# Patient Record
Sex: FEMALE | Race: WHITE | NOT HISPANIC OR LATINO | Employment: UNEMPLOYED | URBAN - METROPOLITAN AREA
[De-identification: names, ages, dates, MRNs, and addresses within clinical notes are randomized per-mention and may not be internally consistent; named-entity substitution may affect disease eponyms.]

---

## 2019-01-08 ENCOUNTER — OFFICE VISIT (OUTPATIENT)
Dept: PEDIATRICS | Facility: CLINIC | Age: 34
End: 2019-01-08
Payer: COMMERCIAL

## 2019-01-08 VITALS
DIASTOLIC BLOOD PRESSURE: 50 MMHG | BODY MASS INDEX: 27.79 KG/M2 | WEIGHT: 172.9 LBS | SYSTOLIC BLOOD PRESSURE: 100 MMHG | OXYGEN SATURATION: 97 % | TEMPERATURE: 98 F | HEART RATE: 66 BPM | HEIGHT: 66 IN

## 2019-01-08 DIAGNOSIS — L50.8 RECURRENT PERIODIC URTICARIA: Primary | ICD-10-CM

## 2019-01-08 PROCEDURE — 99203 OFFICE O/P NEW LOW 30 MIN: CPT | Performed by: NURSE PRACTITIONER

## 2019-01-08 RX ORDER — METHYLPREDNISOLONE 4 MG
TABLET, DOSE PACK ORAL
Qty: 21 TABLET | Refills: 0 | Status: SHIPPED | OUTPATIENT
Start: 2019-01-08 | End: 2020-12-09

## 2019-01-08 RX ORDER — LORATADINE 10 MG/1
10 TABLET ORAL DAILY
COMMUNITY
End: 2020-12-09

## 2019-01-08 ASSESSMENT — MIFFLIN-ST. JEOR: SCORE: 1499.99

## 2019-01-08 NOTE — PATIENT INSTRUCTIONS
PLAN:   1.   Symptomatic therapy suggested: continue claritin at least a week   2.  Orders Placed This Encounter   Medications     loratadine (CLARITIN) 10 MG tablet     Sig: Take 10 mg by mouth daily     methylPREDNISolone (MEDROL DOSEPAK) 4 MG tablet therapy pack     Sig: Follow package instructions     Dispense:  21 tablet     Refill:  0     3. Patient needs to follow up in if no improvement,or sooner if worsening of symptoms or other symptoms develop.  If recurrent please let me know and will refer to allergist         Patient Education     Hives (Adult)  Hives are pink or red bumps on the skin. These bumps are also known as wheals. The bumps can itch, burn, or sting. Hives can occur anywhere on the body. They vary in size and shape and can form in clusters. Individual hives can appear and go away quickly. New hives may develop as old ones fade. Hives are common and usually harmless. Occasionally hives are a sign of a serious allergy.  Hives are often caused by an allergic reaction. It may be an allergic reaction to foods such as fruit, shellfish, chocolate, nuts, or tomatoes. It may be a reaction to pollens, animal fur, or mold spores. Medicines, chemicals, and insect bites can also cause hives. And hives can be caused by hot sun or cold air. The cause of hives can be difficult to find.  You may be given medicines to relieve swelling and itching. Follow all instructions when using these medicines. The hives will usually fade in a few days, but can last up to 2 weeks.  Home care  Follow these tips:    Try to find the cause of the hives and eliminate it. Discuss possible causes with your healthcare provider. Future reactions to the same allergen may be worse.    Don t scratch the hives. Scratching will delay healing. To reduce itching, apply cool, wet compresses to the skin.    Dress in soft, loose cotton clothing.    Don t bathe in hot water. This can make the itching worse.    Apply an ice pack or cool pack  wrapped in a thin towel to your skin. This will help reduce redness and itching. But if your hives were caused by exposure to cold, then do not apply more cold to them.    You may use over-the counter antihistamines to reduce itching. Some older antihistamines, such as diphenhydramine and chlorpheniramine, are inexpensive. But they need to be taken often and may make you sleepy. They are best used at bedtime. Don t use diphenhydramine if you have glaucoma or have trouble urinating because of an enlarged prostate. Newer antihistamines, such as loratadine, cetirizine, and fexofenadine, are generally more expensive. But they tend to have fewer side effects, such as drowsiness. They can be taken less often.    Another type of antihistamine is used to treat heartburn. This type includes ranitidine, nizatidine, famotidine, and cimetidine. These are sometimes used along with the above antihistamines if a single medicine is not working.  Follow-up care  Follow up with your healthcare provider if your symptoms don't get better in 2 days. Ask your provider about allergy testing if you have had a severe reaction, or have had several episodes of hives. He or she can use the allergy testing to find out what you are allergic to.  When to seek medical advice  Call your healthcare provider right away if any of these occur:    Fever of 100.4 F (38.0 C) or higher, or as directed by your healthcare provider    Redness, swelling, or pain    Foul-smelling fluid coming from the rash  Call 911  Call 911 if any of the following occur:    Swelling of the face, throat, or tongue    Trouble breathing or swallowing    Dizziness, weakness, or fainting  Date Last Reviewed: 9/1/2016 2000-2018 [employer]. [address] [place] [address], [place] [address]. All rights reserved. This information is not intended as a substitute for professional medical care. Always follow your healthcare professional's instructions.

## 2019-01-08 NOTE — PROGRESS NOTES
SUBJECTIVE:   Judie Guallpa is a 33 year old female who presents to clinic today for the following health issues:    New Patient/Transfer of Care-was seen at a ob/gyn at [facility].    Rash  Onset: 1 month    Description:   Location: hives in the inner thigh, under the breast, on the side. Noticed it when she has been sweating  Character: round, raised, red  Itching (Pruritis): YES    Progression of Symptoms:  same    Accompanying Signs & Symptoms:  Fever: no   Body aches or joint pain: no   Sore throat symptoms: no   Recent cold symptoms: no     History:   Previous similar rash: no     Precipitating factors:   Exposure to similar rash: no   New exposures: None and only had worn a new dress   Recent travel: no     Alleviating factors:  none    Therapies Tried and outcome: Claritin, benadryl   Having recurrent hives in the last month or over   Will take Benaryl and they will resolve   Started taking claritin daily and that helped but did not take it this morning and ended up with hives today   Has not been on one particular spot   Has noticed with a new dress  Noticed after exercise   Laundry detergent is same  Uses her shampoo as her body wash which is not new to her as done   No fabric softener   Has completely gone away in between   Will be on different part of her body       Problem list and histories reviewed & adjusted, as indicated.  Additional history: as documented    There is no problem list on file for this patient.    History reviewed. No pertinent surgical history.    Social History     Tobacco Use     Smoking status: Never Smoker     Smokeless tobacco: Never Used   Substance Use Topics     Alcohol use: Yes     Comment: socially     Family History   Problem Relation Age of Onset     Breast Cancer Maternal Aunt      Cancer Maternal Aunt         thyroid cancer     Thyroid Disease Maternal Aunt          Current Outpatient Medications   Medication Sig Dispense Refill     loratadine (CLARITIN) 10 MG  "tablet Take 10 mg by mouth daily       No Known Allergies  BP Readings from Last 3 Encounters:   01/08/19 100/50    Wt Readings from Last 3 Encounters:   01/08/19 78.4 kg (172 lb 14.4 oz)           Labs reviewed in EPIC    Reviewed and updated as needed this visit by clinical staff  Tobacco  Allergies  Meds  Med Hx  Surg Hx  Fam Hx  Soc Hx      Reviewed and updated as needed this visit by Provider         ROS:  Constitutional, HEENT, cardiovascular, pulmonary, gi and gu systems are negative, except as otherwise noted.    OBJECTIVE:     /50 (BP Location: Right arm, Patient Position: Sitting, Cuff Size: Adult Regular)   Pulse 66   Temp 98  F (36.7  C) (Temporal)   Ht 1.683 m (5' 6.25\")   Wt 78.4 kg (172 lb 14.4 oz)   LMP 12/30/2018   SpO2 97%   Breastfeeding? No   BMI 27.70 kg/m    Body mass index is 27.7 kg/m .  GENERAL APPEARANCE: alert, active and no distress  RESP: lungs clear to auscultation - no rales, rhonchi or wheezes  CV: regular rates and rhythm and no murmur, click or rub  MS: extremities normal- no gross deformities noted  SKIN: negatives: color normal, temperature normal, mobility and turgor normal, positives:   Examination of the rash reveals: Hives:scattered  multiple pleomorphic, raised, well-defined, blanching patches with wheals and flares.  NEURO: mentation intact and speech normal  PSYCH: mentation appears normal and affect normal/bright    Diagnostic Test Results:  none     ASSESSMENT/PLAN:     Judie was seen today for establish care and hives.    Diagnoses and all orders for this visit:    Recurrent periodic urticaria  -     methylPREDNISolone (MEDROL DOSEPAK) 4 MG tablet therapy pack; Follow package instructions      PLAN:   1.   Symptomatic therapy suggested: continue claritin at least a week   2.  Orders Placed This Encounter   Medications     loratadine (CLARITIN) 10 MG tablet     Sig: Take 10 mg by mouth daily     methylPREDNISolone (MEDROL DOSEPAK) 4 MG tablet therapy " pack     Sig: Follow package instructions     Dispense:  21 tablet     Refill:  0     3. Patient needs to follow up in if no improvement,or sooner if worsening of symptoms or other symptoms develop.  If recurrent please let me know and will refer to allergist       See Patient Instructions    GLADYS Mcclure [facility]

## 2019-01-08 NOTE — NURSING NOTE
"Chief Complaint   Patient presents with     [facility] Care     Hives     hives x 1 month       Initial /50 (BP Location: Right arm, Patient Position: Sitting, Cuff Size: Adult Regular)   Pulse 66   Temp 98  F (36.7  C) (Temporal)   Ht 1.683 m (5' 6.25\")   Wt 78.4 kg (172 lb 14.4 oz)   LMP 12/30/2018   SpO2 97%   Breastfeeding? No   BMI 27.70 kg/m   Estimated body mass index is 27.7 kg/m  as calculated from the following:    Height as of this encounter: 1.683 m (5' 6.25\").    Weight as of this encounter: 78.4 kg (172 lb 14.4 oz).  Medication Reconciliation: complete      LINH Khoury      "

## 2020-03-11 ENCOUNTER — HEALTH MAINTENANCE LETTER (OUTPATIENT)
Age: 35
End: 2020-03-11

## 2020-11-10 ENCOUNTER — HOSPITAL ENCOUNTER (OUTPATIENT)
Dept: BEHAVIORAL HEALTH | Facility: CLINIC | Age: 35
Discharge: HOME | End: 2020-11-10
Attending: PSYCHIATRY & NEUROLOGY | Admitting: PSYCHIATRY & NEUROLOGY
Payer: COMMERCIAL

## 2020-11-10 PROCEDURE — 90791 PSYCH DIAGNOSTIC EVALUATION: CPT | Mod: GT | Performed by: COUNSELOR

## 2020-11-10 NOTE — PROGRESS NOTES
"Mental [facility] Assessment [facility]  Evaluator Name:  Radha White, KIAN, [facility], [facility]    PATIENT'S NAME: Judie Guallpa  PREFERRED NAME: Judie  PRONOUNS:     She/her  MRN:   [id number]  :   1985   ACCT. NUMBER: [id number]  DATE OF SERVICE: 11/10/20  START TIME: 1:01pm  END TIME: 1:41pm  PREFERRED PHONE:  [phone]  [email or web address]    May we leave a program related message: Yes  SERVICE MODALITY:  Video Visit:  Telemedicine Visit: The patient's condition can be safely assessed and treated via synchronous audio and visual telemedicine encounter.      Reason for Telemedicine Visit: Services only offered telehealth Covid    Originating Site (Patient Location): Patient's home    Distant Site (Provider Location): Provider Remote Setting    Consent:  The patient/guardian has verbally consented to: the potential risks and benefits of telemedicine (video visit) versus in person care; bill my insurance or make self-payment for services provided; and responsibility for payment of non-covered services.     Patient would like the video invitation sent by: Send to e-mail at: [email or web address]    Mode of Communication:  Video Conference via [facility]    As the provider I attest to compliance with applicable laws and regulations related to telemedicine.    UNIVERSAL ADULT DIAGNOSTIC ASSESSMENT      Identifying Information:  Patient is a 35 year old.  The pronoun use throughout this assessment reflects the patient's chosen pronoun.  Patient was referred for an assessment by self.  Patient attended the session alone.     Chief Complaint:   The reason for seeking services at this time is: \"I'm just struggling and not happy. It's been going on for a while.\" She wants to talk with someone. She estimates she felt this way before Covid, probably since she had her third child, but things have worsened with Covid     Social/Family History:  Patient reported they grew up in [place]. Her parents  when patient was " "age 8. He mother remarried and she had a stepfather. She has a biological younger brother and a younger half-sister. She has a stepsister her age and a younger stepsister.    She denied abuse but reported having \"blended family issues.\"    The patient describes their cultural background as White , middle class, [place].  Cultural influences and impact on patient's life structure, values, norms, and healthcare: None.  Contextual influences on patient's health include: None.    These factors will be addressed in the Preliminary Treatment plan.  Patient identified their preferred language to be English. Patient reported they does not need the assistance of an  or other support involved in therapy.     Patient's highest education level was graduate school in [employer]. Patient identified the following learning problems: none reported.  Modifications will not be used to assist communication in therapy. Patient reports they are  able to understand written materials.    Patient's current relationship status is  for 11 years. Patient reported having three child(natali) ages 9, 5, and 3. Patient identified no one as part of their support system. She feels like she lost all of her friends when Covid happened. She and her family moved back to [place] from [place] 2.5 years ago. She felt she was starting to make friends in her neighborhood, but can't now due to Covid. Patient reported she and her  have an appointment to virtually see a marriage therapist in [place] on Thursday. Her  set up the appointment. She stated when they talk, it always devolves into arguments. There have been angry words about divorce, but not serious. She couldn't think of hobbies she had before having children. She forces herself to run, but hates running in winter. She also stated if she ran for an hour, then she would return to a big mess in the house. Her  doesn't do much, but he tries to help. " "He works a lot of strange hours - all 3 shifts. He gets angry and mad at the kids because he wants to spend time with just her. There is tension and frustrations and she feels she is having too much time with one person. Her  gets to leave the house, but she doesn't leave or get to talk to people. She feels alone, angry. She talks with family, but they live in [place] and not [facility]. Her grandmother lives with her mother.      Patient lives her  and kids. Housing is stable. They live in a newer neighborhood and they can all see everyone's business. \"I care too much about everyone around me.\" She sees her neighbors outside socializing, sometimes distancing, and she wishes she were included, but she worries about Covid. She wants to close all the windows and not hear sounds. It is not fair that they are having fun. She has worried about what other people think.    Patient is not working and stays at home. She feels guilty when she takes the kids to activities because she feels like she is putting them at risk for Covid.   Patient reports their finances are obtained through spouse. Her  is physician and they have lived in [place] and [place] for his schooling/work.  Patient does not identify finances as a current stressor.      Patient reported that they have not been involved with the legal system.    Patient's Strengths and Limitations:   Patient identified the following strengths or resources that will help them succeed in treatment: They don't worry about finances. Things that may interfere with the patient's success in treatment include: few friends and lack of family support.     Personal and Family Medical History:   Patient does report a family history of mental health concerns. She believes her mother is anxious. Her mother's parents were recovering alcoholics.   Patient reports family history includes Breast Cancer in her maternal aunt; Cancer in her maternal aunt; Thyroid Disease in " her maternal aunt..    Her father has implied his sisters have depression. She has maternal aunts with Bipolar. Her yougner sister struggles with anxiety, but struggles with anxiety.     Patient reported the following previous diagnoses which include(s): none reported. Patient briefly worked with a counselor during graduate school while writing her thesis. She and her  started with couples counseling and she continued individually. Psychiatric Hospitalizations: None.  Patient denies a history of civil commitment.  Currently, patient is not receiving other mental health services.      Patient has not had a physical exam to rule out medical causes for current symptoms.  Date of last physical exam was greater than a year ago and client was encouraged to schedule an exam with PCP. The patient does not have a Primary Care Provider and was encouraged to establish care with a PCP.  Patient reports no current medical concerns.  There are not significant appetite / nutritional concerns / weight changes - but she stress eats. Patient does not report a history of head injury / trauma / cognitive impairment.     Current Outpatient Medications   Medication     loratadine (CLARITIN) 10 MG tablet     methylPREDNISolone (MEDROL DOSEPAK) 4 MG tablet therapy pack     Medication Adherence:  Patient reports is not taking medications.  She took Zoloft during and after third pregnancy. She thinks it helped. She denied other meds    Patient Allergies:  No Known Allergies    Medical History:  No past medical history on file.    Current Mental Status Exam:   Appearance:  Appropriate    Eye Contact:  Good   Psychomotor:  Normal       Gait / station:  no problem  Attitude / Demeanor: Cooperative  Friendly  Speech      Rate / Production: Normal/ Responsive      Volume:  Normal  volume      Language:  intact  Mood:   Sad   Affect:   Appropriate    Thought Content: Clear   Thought Process: Coherent       Associations: No loosening of  associations  Insight:   Good   Judgment:  Intact   Orientation:  All  Attention/concentration: Good    Rating Scales:    PHQ9:    PHQ-9 SCORE 11/6/2020 11/10/2020   PHQ-9 Total Score MyChart 12 (Moderate depression) 8 (Mild depression)   PHQ-9 Total Score 12 8   ;    GAD7:    VENKATESH-7 SCORE 11/6/2020 11/10/2020   Total Score 17 (severe anxiety) 17 (severe anxiety)   Total Score 17 17     Clinical Global Impressions  First:  Considering your total clinical experience with this particular patient population, how severe are the patient's symptoms at this time?: 5 (11/10/2020  1:20 PM)  Most recent:  Compared to the patient's condition at the START of treatment, this patient's condition is: 4 (11/10/2020  1:20 PM)    Substance Use:  Patient did report a family history of substance use concerns; see medical history section for details.  Patient has not received chemical dependency treatment in the past.  Patient has not ever been to detox. Patient reported the following problems as a result of their substance use: None.    Patient drinks wine a couple of times per week, consuming 1 or 2 glasses at dinner. She stated it is not a problem.   Patient denies using tobacco.  Patient denies using marijuana.  Patient drinks 2 cups of coffee in morning.   Patient reported no other substance use.     CAGE-AID Total Score 11/10/2020   Total Score 0     Based on the negative CAGE score and clinical interview there  are not indications of drug or alcohol abuse.    Significant Losses / Trauma / Abuse / Neglect Issues:   Patient did not serve in the . Concerns for possible neglect are not present. Patient did not indicate or report of significant loss, trauma, abuse or neglect issues    Safety Assessment:   Current Safety Concerns:  [place] Suicide Severity Rating Scale (Short Version)  [place] Suicide Severity Rating (Short Version) 11/10/2020   Over the past 2 weeks have you felt down, depressed, or hopeless? yes   Over the  past 2 weeks have you had thoughts of killing yourself? no   Have you ever attempted to kill yourself? no     Patient denies current homicidal ideation and behaviors.  Patient denies current self-injurious ideation and behaviors. She denied thoughts of suicide, but reported she wants to pack up and leave or be away from the house.   Patient denied risk behaviors associated with substance use.  Patient denies any high risk behaviors associated with mental health symptoms.  Patient reports the following current concerns for their personal safety: None.  Patient reports there are  firearms in the house. The firearms are secured in a locked space - in her 's safe.     History of Safety Concerns:  Patient denied a history of homicidal ideation.     Patient denied a history of personal safety concerns.    Patient denied a history of assaultive behaviors.    Patient denied a history of sexual assault behaviors.     Patient denied a history of risk behaviors associated with substance use.  Patient denies any history of high risk behaviors associated with mental health symptoms.  Patient reports the following protective factors: dedication to family/friends, safe and stable environment, abstinence from substances, living with other people, daily obligations and healthy fear of risky behaviors or pain    Risk Plan:  See Recommendations for Safety and Risk Management Plan    Review of Symptoms per patient report:  Depression: Lack of interest, Excessive or inappropriate guilt, Feelings of helplessness, Feeling sad, down, or depressed and Anger outbursts     Kristen:  No Symptoms  Psychosis: No Symptoms  Anxiety: Excessive worry, Nervousness, Ruminations, Poor concentration and Irritability - She has been snappy, loses her temper, not want to be in the same room as  and it is better when he is gone. She is mad and nervous about everything and worried about Covid and kids going to activities.   Panic:  No  symptoms  Post Traumatic Stress Disorder:  No Symptoms   Eating Disorder: No Symptoms  ADD / ADHD:  No symptoms  Conduct Disorder: No symptoms  Autism Spectrum Disorder: No symptoms  Obsessive Compulsive Disorder: No Symptoms    Patient reports the following compulsive behaviors and treatment history: None.      Diagnostic Criteria:   A. Excessive anxiety and worry about a number of events or activities (such as work or school performance).   B. The person finds it difficult to control the worry.  C. Select 3 or more symptoms (required for diagnosis). Only one item is required in children.   - Restlessness or feeling keyed up or on edge.    - Being easily fatigued.    - Difficulty concentrating or mind going blank.    - Irritability.    - Muscle tension.    - Sleep disturbance (difficulty falling or staying asleep, or restless unsatisfying sleep).   D. The focus of the anxiety and worry is not confined to features of an Axis I disorder.  E. The anxiety, worry, or physical symptoms cause clinically significant distress or impairment in social, occupational, or other important areas of functioning.   F. The disturbance is not due to the direct physiological effects of a substance (e.g., a drug of abuse, a medication) or a general medical condition (e.g., hyperthyroidism) and does not occur exclusively during a Mood Disorder, a Psychotic Disorder, or a Pervasive Developmental Disorder.  A) Recurrent episode(s) - symptoms have been present during the same 2-week period and represent a change from previous functioning 5 or more symptoms (required for diagnosis)   - Depressed mood. Note: In children and adolescents, can be irritable mood.     - Diminished interest or pleasure in all, or almost all, activities.     Functional Status:  Patient reports the following functional impairments: childcare / parenting, relationship(s) and self-care.      WHODAS 2.0 Total Score 11/10/2020   Total Score 21   Total Score MyChart 21        Clinical Summary:  1. Reason for assessment: patient is unsure what to do. She wants to talk with someone  2. Psychosocial, Cultural and Contextual Factors: None identified  3. Principal DSM5 Diagnoses  (Sustained by DSM5 Criteria Listed Above):   300.02 (F41.1) Generalized Anxiety Disorder   4. Other Diagnoses that is relevant to services:   311 (F32.9) Unspecified Depressive Disorder   5. Provisional Diagnosis:  none  6. Prognosis: Expect Improvement  7. Likely consequences of symptoms if not treated: Patient may need higher level of care  8. Client strengths include:  creative, educated, empathetic, good listener, insightful and intelligent    Recommendations:     1. Plan for Safety and Risk Management:   Recommended that patient call 911 or go to the local ED should there be a change in any of these risk factors. Report to child / adult protection services was NA.      2. Patient did not identify concerns with a cultural influence.     3. Initial Treatment will focus on: Depressed Mood, Anxiety.     4. Resources/Service Plan:    services are not indicated.   Modifications to assist communication are not indicated.   Additional disability accommodations are not indicated.      5. Collaboration:  Collaboration / coordination of treatment will be initiated with the following support professionals: None.     6.  Referrals:   Patient will establish primary care with a doctor and have a physical to rule out underlying medical concerns. Patient may discuss resuming Zoloft with the doctor. Patient and her  have couple's counseling set up this week.  recommended attending the couple's counseling and also establishing individual therapy within the same agency.      7. REYNALDO: Recommendations:  Continue to drink responsibly     8. Records were reviewed at time of assessment. Information in this assessment was obtained from the medical record and provided by patient who is a good historian.  Patient will have open access to their mental health medical record.    Eval type:  Mental Health    Provider Name/ Credentials:  KIAN Aguilar, KM, CAROLE     November 10, 2020

## 2020-12-09 ENCOUNTER — OFFICE VISIT (OUTPATIENT)
Dept: PEDIATRICS | Facility: CLINIC | Age: 35
End: 2020-12-09
Payer: COMMERCIAL

## 2020-12-09 VITALS
OXYGEN SATURATION: 99 % | BODY MASS INDEX: 27.14 KG/M2 | HEIGHT: 66 IN | HEART RATE: 61 BPM | DIASTOLIC BLOOD PRESSURE: 72 MMHG | WEIGHT: 168.9 LBS | TEMPERATURE: 97.9 F | SYSTOLIC BLOOD PRESSURE: 109 MMHG

## 2020-12-09 DIAGNOSIS — R53.83 OTHER FATIGUE: ICD-10-CM

## 2020-12-09 DIAGNOSIS — F43.23 ADJUSTMENT DISORDER WITH MIXED ANXIETY AND DEPRESSED MOOD: Primary | ICD-10-CM

## 2020-12-09 LAB
BASOPHILS # BLD AUTO: 0.1 10E9/L (ref 0–0.2)
BASOPHILS NFR BLD AUTO: 0.6 %
DIFFERENTIAL METHOD BLD: NORMAL
EOSINOPHIL # BLD AUTO: 0.1 10E9/L (ref 0–0.7)
EOSINOPHIL NFR BLD AUTO: 0.8 %
ERYTHROCYTE [DISTWIDTH] IN BLOOD BY AUTOMATED COUNT: 12.4 % (ref 10–15)
HCT VFR BLD AUTO: 41.3 % (ref 35–47)
HGB BLD-MCNC: 13.2 G/DL (ref 11.7–15.7)
IMM GRANULOCYTES # BLD: 0 10E9/L (ref 0–0.4)
IMM GRANULOCYTES NFR BLD: 0.1 %
LYMPHOCYTES # BLD AUTO: 2.4 10E9/L (ref 0.8–5.3)
LYMPHOCYTES NFR BLD AUTO: 31 %
MCH RBC QN AUTO: 30.9 PG (ref 26.5–33)
MCHC RBC AUTO-ENTMCNC: 32 G/DL (ref 31.5–36.5)
MCV RBC AUTO: 97 FL (ref 78–100)
MONOCYTES # BLD AUTO: 0.6 10E9/L (ref 0–1.3)
MONOCYTES NFR BLD AUTO: 7.9 %
NEUTROPHILS # BLD AUTO: 4.6 10E9/L (ref 1.6–8.3)
NEUTROPHILS NFR BLD AUTO: 59.6 %
PLATELET # BLD AUTO: 260 10E9/L (ref 150–450)
RBC # BLD AUTO: 4.27 10E12/L (ref 3.8–5.2)
TSH SERPL DL<=0.005 MIU/L-ACNC: 2.02 MU/L (ref 0.4–4)
WBC # BLD AUTO: 7.8 10E9/L (ref 4–11)

## 2020-12-09 PROCEDURE — 84443 ASSAY THYROID STIM HORMONE: CPT | Performed by: FAMILY MEDICINE

## 2020-12-09 PROCEDURE — 85025 COMPLETE CBC W/AUTO DIFF WBC: CPT | Performed by: FAMILY MEDICINE

## 2020-12-09 PROCEDURE — 82306 VITAMIN D 25 HYDROXY: CPT | Performed by: FAMILY MEDICINE

## 2020-12-09 PROCEDURE — 99214 OFFICE O/P EST MOD 30 MIN: CPT | Performed by: FAMILY MEDICINE

## 2020-12-09 PROCEDURE — 96127 BRIEF EMOTIONAL/BEHAV ASSMT: CPT | Performed by: FAMILY MEDICINE

## 2020-12-09 PROCEDURE — 36415 COLL VENOUS BLD VENIPUNCTURE: CPT | Performed by: FAMILY MEDICINE

## 2020-12-09 ASSESSMENT — ANXIETY QUESTIONNAIRES
GAD7 TOTAL SCORE: 13
1. FEELING NERVOUS, ANXIOUS, OR ON EDGE: MORE THAN HALF THE DAYS
5. BEING SO RESTLESS THAT IT IS HARD TO SIT STILL: NOT AT ALL
6. BECOMING EASILY ANNOYED OR IRRITABLE: NEARLY EVERY DAY
IF YOU CHECKED OFF ANY PROBLEMS ON THIS QUESTIONNAIRE, HOW DIFFICULT HAVE THESE PROBLEMS MADE IT FOR YOU TO DO YOUR WORK, TAKE CARE OF THINGS AT HOME, OR GET ALONG WITH OTHER PEOPLE: VERY DIFFICULT
7. FEELING AFRAID AS IF SOMETHING AWFUL MIGHT HAPPEN: SEVERAL DAYS
2. NOT BEING ABLE TO STOP OR CONTROL WORRYING: NEARLY EVERY DAY
3. WORRYING TOO MUCH ABOUT DIFFERENT THINGS: NEARLY EVERY DAY

## 2020-12-09 ASSESSMENT — MIFFLIN-ST. JEOR: SCORE: 1471.85

## 2020-12-09 ASSESSMENT — PATIENT HEALTH QUESTIONNAIRE - PHQ9
SUM OF ALL RESPONSES TO PHQ QUESTIONS 1-9: 11
5. POOR APPETITE OR OVEREATING: SEVERAL DAYS

## 2020-12-09 NOTE — PROGRESS NOTES
Subjective     Judie Guallpa is a 35 year old female who presents to clinic today for the following health issues:          HPI          Patient is new to the provider, is here today with concerns of having progressively worsening depression and anxiety for the past few months though symptoms have been present for a few years now  Was treated for Zoloft during pregnancy with her third child 2 years ago, that she extended the treatment for 6 months postpartum  Denies panic attacks, suicidal ideations, recreational drug use, tobacco use  Uses alcohol 1-2 servings of wine 3 times a week  Has problems with sleep secondary to mood symptoms  Patient is currently in counseling and was informed to follow-up with a provider to discuss about medications to help with symptoms on counseling patient is also wondering if she can get labs to screen for other disorders that could be aggravating her mood symptoms  Denies underlying history of anemia, abnormal bleeding, thyroid disorder  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  Feeling tired and exhausted secondary to feeling emotional distress  Other HPI as mentioned below        -Mood Problems: Depression and Anxiety Issues-Did a screening through [facility] and was informed to see her Primary Provider. Also seeing a therapist and suggested seeing her Dr as well.         How many servings of fruits and vegetables do you eat daily?  2-3    On average, how many sweetened beverages do you drink each day (Examples: soda, juice, sweet tea, etc.  Do NOT count diet or artificially sweetened beverages)?   0    How many days per week do you exercise enough to make your heart beat faster? 3 or less    How many minutes a day do you exercise enough to make your heart beat faster? 30 - 60    How many days per week do you miss taking your medication? 0        Review of Systems   CONSTITUTIONAL: NEGATIVE for  "fever, chills, change in weight  CONSTITUTIONAL: Fatigue  RESP: NEGATIVE for significant cough or SOB  CV: NEGATIVE for chest pain, palpitations or peripheral edema  NEURO: NEGATIVE for weakness, dizziness or paresthesias  ENDOCRINE: NEGATIVE for temperature intolerance, skin/hair changes  PSYCHIATRIC: as above      Objective    /72 (BP Location: Right arm, Patient Position: Sitting, Cuff Size: Adult Large)   Pulse 61   Temp 97.9  F (36.6  C) (Temporal)   Ht 1.683 m (5' 6.25\")   Wt 76.6 kg (168 lb 14.4 oz)   LMP 11/12/2020 (Exact Date)   SpO2 99%   BMI 27.06 kg/m    Body mass index is 27.06 kg/m .  Physical Exam   GENERAL: healthy, alert and no distress  RESP: lungs clear to auscultation - no rales, rhonchi or wheezes  CV: regular rate and rhythm, normal S1 S2, no S3 or S4, no murmur, click or rub, no peripheral edema and peripheral pulses strong  NEURO: Normal strength and tone, mentation intact and speech normal  PSYCH: mentation appears normal, tearful and anxious            Assessment & Plan     Adjustment disorder with mixed anxiety and depressed mood  Reviewed relaxation techniques  Recommended to start on brisk walking for at least 20 to 30 minutes daily  Recommended to start on Zoloft 50 mg daily  Continue with counseling  Follow for recheck in 5 to 6 weeks through virtual visit or sooner if needed  Dosing and potential medication side effects discussed.  Patient verbalised understanding and is agreeable to the plan.    - sertraline (ZOLOFT) 50 MG tablet; Take 1 tablet (50 mg) by mouth every evening  - EMOTIONAL / BEHAVIORAL ASSESSMENT        Other fatigue  ddx-ongoing underlying untreated depression, screen for anemia/thyroid disorder/vitamin D deficiency  Will f/u on results and call with recommendations.  Patient verbalised understanding and is agreeable to the plan.    - CBC with platelets and differential  - TSH with free T4 reflex  - Vitamin D Deficiency     BMI:   Estimated body mass " "index is 27.06 kg/m  as calculated from the following:    Height as of this encounter: 1.683 m (5' 6.25\").    Weight as of this encounter: 76.6 kg (168 lb 14.4 oz).            Chart documentation done in part with Dragon Voice recognition Software. Although reviewed after completion, some word and grammatical error may remain.    See Patient Instructions    No follow-ups on file.    Yissel Fitzpatrick MD  [facility]  Chart forwarded to PCP for FYI       "

## 2020-12-09 NOTE — PATIENT INSTRUCTIONS
Get the labs today  Get the flu shot today  Start on ZOLOFT 50 mg daily in the evening  Schedule for virtual recheck in 5-6 weeks

## 2020-12-10 LAB — DEPRECATED CALCIDIOL+CALCIFEROL SERPL-MC: 40 UG/L (ref 20–75)

## 2020-12-10 ASSESSMENT — ANXIETY QUESTIONNAIRES: GAD7 TOTAL SCORE: 13

## 2020-12-10 NOTE — RESULT ENCOUNTER NOTE
Sloan Dimas,  Your test results showed normal vitamin D, thyroid functions, hemoglobin and blood counts.  These are reassuring.   Let me know if you have any questions. Take care.  Yissel Fitzpatrick MD

## 2021-01-03 ENCOUNTER — HEALTH MAINTENANCE LETTER (OUTPATIENT)
Age: 36
End: 2021-01-03

## 2021-01-29 ENCOUNTER — VIRTUAL VISIT (OUTPATIENT)
Dept: FAMILY MEDICINE | Facility: CLINIC | Age: 36
End: 2021-01-29
Payer: COMMERCIAL

## 2021-01-29 DIAGNOSIS — F43.23 ADJUSTMENT DISORDER WITH MIXED ANXIETY AND DEPRESSED MOOD: ICD-10-CM

## 2021-01-29 PROCEDURE — 96127 BRIEF EMOTIONAL/BEHAV ASSMT: CPT | Performed by: FAMILY MEDICINE

## 2021-01-29 PROCEDURE — 99214 OFFICE O/P EST MOD 30 MIN: CPT | Mod: 95 | Performed by: FAMILY MEDICINE

## 2021-01-29 RX ORDER — BUPROPION HYDROCHLORIDE 150 MG/1
150 TABLET ORAL EVERY MORNING
Qty: 30 TABLET | Refills: 1 | Status: SHIPPED | OUTPATIENT
Start: 2021-01-29 | End: 2021-07-12

## 2021-01-29 ASSESSMENT — ANXIETY QUESTIONNAIRES
5. BEING SO RESTLESS THAT IT IS HARD TO SIT STILL: MORE THAN HALF THE DAYS
6. BECOMING EASILY ANNOYED OR IRRITABLE: MORE THAN HALF THE DAYS
GAD7 TOTAL SCORE: 12
3. WORRYING TOO MUCH ABOUT DIFFERENT THINGS: MORE THAN HALF THE DAYS
2. NOT BEING ABLE TO STOP OR CONTROL WORRYING: MORE THAN HALF THE DAYS
1. FEELING NERVOUS, ANXIOUS, OR ON EDGE: MORE THAN HALF THE DAYS
7. FEELING AFRAID AS IF SOMETHING AWFUL MIGHT HAPPEN: NOT AT ALL

## 2021-01-29 ASSESSMENT — PATIENT HEALTH QUESTIONNAIRE - PHQ9: 5. POOR APPETITE OR OVEREATING: MORE THAN HALF THE DAYS

## 2021-01-29 NOTE — PATIENT INSTRUCTIONS
Start on Wellbutrin 150 mg daily in the morning  Continue with Zoloft 50 mg the evening  Schedule for recheck through virtual visit in 2 months

## 2021-01-29 NOTE — PROGRESS NOTES
"Judie is a 36 year old who is being evaluated via a billable video visit.      How would you like to obtain your AVS? MyChart  If the video visit is dropped, the invitation should be resent by: Text to cell phone: see epic  Will anyone else be joining your video visit? No    Video Start Time: 2:05pm  Assessment & Plan     Adjustment disorder with mixed anxiety and depressed mood  Needing improvement  We will start on Wellbutrin 150 mg daily in the morning, continue Zoloft 50 mg daily in the evening.  Continue with relaxation techniques and regular exercises and healthy eating  Follow-up recheck in 2 months or sooner if needed  Dosing and potential medication side effects discussed.  Patient verbalised understanding and is agreeable to the plan.  PHQ 11/6/2020 12/9/2020   PHQ-9 Total Score 12 11   Q9: Thoughts of better off dead/self-harm past 2 weeks Not at all Not at all   Some encounter information is confidential and restricted. Go to Review Flowsheets activity to see all data.     VENKATESH-7 SCORE 11/10/2020 12/9/2020 1/29/2021   Total Score 17 (severe anxiety) - -   Total Score - 13 12   Some encounter information is confidential and restricted. Go to Review Flowsheets activity to see all data.         - sertraline (ZOLOFT) 50 MG tablet; Take 1 tablet (50 mg) by mouth every evening  - buPROPion (WELLBUTRIN XL) 150 MG 24 hr tablet; Take 1 tablet (150 mg) by mouth every morning  - EMOTIONAL / BEHAVIORAL ASSESSMENT            [id number]}     BMI:   Estimated body mass index is 27.06 kg/m  as calculated from the following:    Height as of 12/9/20: 1.683 m (5' 6.25\").    Weight as of 12/9/20: 76.6 kg (168 lb 14.4 oz).   Weight management plan: Discussed healthy diet and exercise guidelines      Regular exercise  Chart documentation done in part with Dragon Voice recognition Software. Although reviewed after completion, some word and grammatical error may remain.    See Patient Instructions    No follow-ups on " file.    Yissel Fitzpatrick MD  [facility] VIGNESH Dimas is a 36 year old who presents to clinic today for the following health issues     HPI   Patient is here for a video visit instead of in person visit due to the current COVID-19 pandemic.  Patient is here for follow-up on her depression and anxiety after being started on Zoloft 50 mg daily few weeks ago.  Patient noted very little improvement in symptoms and she is wondering if she can switch to Wellbutrin 150 mg as suggested by her physician .  Patient denies panic attacks,  Had problems with sleep the past few weeks after starting on Zoloft when she wakes up around 2- 3 AM and was finding it hard to go back to bed.  Denies waking up due to anxiety        Depression and Anxiety Follow-Up    How are you doing with your depression since your last visit?  Very slightly improved    How are you doing with your anxiety since your last visit?  as above      Are you having other symptoms that might be associated with depression or anxiety? No    Have you had a significant life event? No     Do you have any concerns with your use of alcohol or other drugs? No    Social History     Tobacco Use     Smoking status: Never Smoker     Smokeless tobacco: Never Used   Substance Use Topics     Alcohol use: Yes     Comment: socially     Drug use: No     PHQ 11/6/2020 12/9/2020   PHQ-9 Total Score 12 11   Q9: Thoughts of better off dead/self-harm past 2 weeks Not at all Not at all   Some encounter information is confidential and restricted. Go to Review Flowsheets activity to see all data.     VENKATESH-7 SCORE 11/10/2020 12/9/2020 1/29/2021   Total Score 17 (severe anxiety) - -   Total Score - 13 12   Some encounter information is confidential and restricted. Go to Review Flowsheets activity to see all data.     Last PHQ-9 1/29/2021   1.  Little interest or pleasure in doing things 1   2.  Feeling down, depressed, or hopeless 0   3.  Trouble  falling or staying asleep, or sleeping too much 3   4.  Feeling tired or having little energy 2   5.  Poor appetite or overeating 0   6.  Feeling bad about yourself 1   7.  Trouble concentrating 0   8.  Moving slowly or restless 2   Q9: Thoughts of better off dead/self-harm past 2 weeks -   PHQ-9 Total Score -   Difficulty at work, home, or with people -   Some encounter information is confidential and restricted. Go to Review [employer] activity to see all data.     VENKATESH-7  1/29/2021   1. Feeling nervous, anxious, or on edge 2   2. Not being able to stop or control worrying 2   3. Worrying too much about different things 2   4. Trouble relaxing 2   5. Being so restless that it is hard to sit still 2   6. Becoming easily annoyed or irritable 2   7. Feeling afraid, as if something awful might happen 0   VENKATESH-7 Total Score 12   If you checked any problems, how difficult have they made it for you to do your work, take care of things at home, or get along with other people? -   Some encounter information is confidential and restricted. Go to Review [employer] activity to see all data.       Suicide Assessment Five-step Evaluation and Treatment (SAFE-T)            Review of Systems   CONSTITUTIONAL: NEGATIVE for fever, chills, change in weight  PSYCHIATRIC: as above      Objective           Vitals:  No vitals were obtained today due to virtual visit.    Physical Exam   GENERAL: Healthy, alert and no distress  RESP: No audible wheeze, cough, or visible cyanosis.  No visible retractions or increased work of breathing.    PSYCH: Mentation appears normal, affect normal/bright, judgement and insight intact, normal speech and appearance well-groomed.            Video-Visit Details    Type of service:  Video Visit    Video End Time:2:18 PM    Originating Location (pt. Location): Home    Distant Location (provider location):  [facility]     Platform used for Video Visit: [employer]

## 2021-01-30 ASSESSMENT — ANXIETY QUESTIONNAIRES: GAD7 TOTAL SCORE: 12

## 2021-04-25 ENCOUNTER — HEALTH MAINTENANCE LETTER (OUTPATIENT)
Age: 36
End: 2021-04-25

## 2021-07-12 ENCOUNTER — VIRTUAL VISIT (OUTPATIENT)
Dept: FAMILY MEDICINE | Facility: CLINIC | Age: 36
End: 2021-07-12
Payer: COMMERCIAL

## 2021-07-12 DIAGNOSIS — F43.23 ADJUSTMENT DISORDER WITH MIXED ANXIETY AND DEPRESSED MOOD: ICD-10-CM

## 2021-07-12 PROCEDURE — 96127 BRIEF EMOTIONAL/BEHAV ASSMT: CPT | Mod: 95 | Performed by: FAMILY MEDICINE

## 2021-07-12 PROCEDURE — 99213 OFFICE O/P EST LOW 20 MIN: CPT | Mod: 95 | Performed by: FAMILY MEDICINE

## 2021-07-12 ASSESSMENT — ANXIETY QUESTIONNAIRES
3. WORRYING TOO MUCH ABOUT DIFFERENT THINGS: SEVERAL DAYS
5. BEING SO RESTLESS THAT IT IS HARD TO SIT STILL: SEVERAL DAYS
GAD7 TOTAL SCORE: 6
1. FEELING NERVOUS, ANXIOUS, OR ON EDGE: SEVERAL DAYS
7. FEELING AFRAID AS IF SOMETHING AWFUL MIGHT HAPPEN: NOT AT ALL
6. BECOMING EASILY ANNOYED OR IRRITABLE: SEVERAL DAYS
2. NOT BEING ABLE TO STOP OR CONTROL WORRYING: SEVERAL DAYS
IF YOU CHECKED OFF ANY PROBLEMS ON THIS QUESTIONNAIRE, HOW DIFFICULT HAVE THESE PROBLEMS MADE IT FOR YOU TO DO YOUR WORK, TAKE CARE OF THINGS AT HOME, OR GET ALONG WITH OTHER PEOPLE: NOT DIFFICULT AT ALL

## 2021-07-12 ASSESSMENT — PATIENT HEALTH QUESTIONNAIRE - PHQ9
SUM OF ALL RESPONSES TO PHQ QUESTIONS 1-9: 3
5. POOR APPETITE OR OVEREATING: SEVERAL DAYS

## 2021-07-12 NOTE — PROGRESS NOTES
Judie is a 36 year old who is being evaluated via a billable video visit.      How would you like to obtain your AVS? [employer]oonRockethart  If the video visit is dropped, the invitation should be resent by: Text to cell phone: see epic  Will anyone else be joining your video visit? No    Video Start Time: 9:02 AM    Assessment & Plan     Adjustment disorder with mixed anxiety and depressed mood    PHQ 11/6/2020 12/9/2020 7/12/2021   PHQ-9 Total Score 12 11 3   Q9: Thoughts of better off dead/self-harm past 2 weeks Not at all Not at all Not at all   Some encounter information is confidential and restricted. Go to Review Flowsheets activity to see all data.     VENKATESH-7 SCORE 12/9/2020 1/29/2021 7/12/2021   Total Score - - -   Total Score 13 12 6   Some encounter information is confidential and restricted. Go to Review Flowsheets activity to see all data.     Needing improvement  Will increase the dose of sertraline from 50 mg to 75 mg daily, will send a ftopia message in 4 to 6 weeks if no improvement noted by the nurse sooner if needed  Follow-up in 6 months at the time of physical or sooner if needed  Also recommended to start taking over-the-counter vitamin D 1000 units daily  - sertraline (ZOLOFT) 50 MG tablet; Take 1.5 tablets (75 mg) by mouth every evening Dose change  - EMOTIONAL / BEHAVIORAL ASSESSMENT  :374506}     Chart documentation done in part with Dragon Voice recognition Software. Although reviewed after completion, some word and grammatical error may remain.    See Patient Instructions    Return in about 6 months (around 1/12/2022), or if symptoms worsen or fail to improve, for Physical Exam, fasting labs.    Yissel Fitzpatrick MD  [facility]   Judie is a 36 year old who presents for the following health issues   Patient is here for a video visit instead of in person visit due to the current COVID-19 pandemic.      History of Present Illness       She eats 2-3 servings of fruits  and vegetables daily.She consumes 0 sweetened beverage(s) daily.        Depression and Anxiety Follow-Up    How are you doing with your depression since your last visit? No change    How are you doing with your anxiety since your last visit?  Worsened , needing to adjust the dose of medication    Patient was not able to remember to take Wellbutrin in the morning, she has not continued taking it after 2 days of starting    Are you having other symptoms that might be associated with depression or anxiety? No    Have you had a significant life event? No     Do you have any concerns with your use of alcohol or other drugs? No    Social History     Tobacco Use     Smoking status: Never Smoker     Smokeless tobacco: Never Used   Substance Use Topics     Alcohol use: Yes     Comment: socially     Drug use: No     PHQ 11/6/2020 12/9/2020 7/12/2021   PHQ-9 Total Score 12 11 3   Q9: Thoughts of better off dead/self-harm past 2 weeks Not at all Not at all Not at all   Some encounter information is confidential and restricted. Go to Review [employer] activity to see all data.     VENKATESH-7 SCORE 12/9/2020 1/29/2021 7/12/2021   Total Score - - -   Total Score 13 12 6   Some encounter information is confidential and restricted. Go to Review [employer] activity to see all data.     Last PHQ-9 7/12/2021   1.  Little interest or pleasure in doing things 0   2.  Feeling down, depressed, or hopeless 0   3.  Trouble falling or staying asleep, or sleeping too much 1   4.  Feeling tired or having little energy 1   5.  Poor appetite or overeating 0   6.  Feeling bad about yourself 1   7.  Trouble concentrating 0   8.  Moving slowly or restless 0   Q9: Thoughts of better off dead/self-harm past 2 weeks 0   PHQ-9 Total Score 3   Difficulty at work, home, or with people Not difficult at all   Some encounter information is confidential and restricted. Go to Review [employer] activity to see all data.     VENKATESH-7  7/12/2021   1. Feeling nervous,  anxious, or on edge 1   2. Not being able to stop or control worrying 1   3. Worrying too much about different things 1   4. Trouble relaxing 1   5. Being so restless that it is hard to sit still 1   6. Becoming easily annoyed or irritable 1   7. Feeling afraid, as if something awful might happen 0   VENKATESH-7 Total Score 6   If you checked any problems, how difficult have they made it for you to do your work, take care of things at home, or get along with other people? Not difficult at all   Some encounter information is confidential and restricted. Go to Review Flowsheets activity to see all data.               Review of Systems   CONSTITUTIONAL: NEGATIVE for fever, chills, change in weight  PSYCHIATRIC: as above      Objective           Vitals:  No vitals were obtained today due to virtual visit.    Physical Exam   GENERAL: Healthy, alert and no distress  RESP: No audible wheeze, cough, or visible cyanosis.  No visible retractions or increased work of breathing.    PSYCH: Mentation appears normal, affect normal/bright, judgement and insight intact, normal speech and appearance well-groomed.                Video-Visit Details    Type of service:  Video Visit    Video End Time:9:10 AM    Originating Location (pt. Location): Home    Distant Location (provider location):  [facility]     Platform used for Video Visit: [employer]

## 2021-07-12 NOTE — PATIENT INSTRUCTIONS
Increase the dose of Zoloft from 50 mg to 1.5 tablet - 75 mg daily  MyChart or call to schedule for annual physical and recheck in 6 months or sooner if needed

## 2021-07-13 ASSESSMENT — ANXIETY QUESTIONNAIRES: GAD7 TOTAL SCORE: 6

## 2021-10-10 ENCOUNTER — HEALTH MAINTENANCE LETTER (OUTPATIENT)
Age: 36
End: 2021-10-10

## 2022-01-07 DIAGNOSIS — Z13.1 SCREENING FOR DIABETES MELLITUS (DM): ICD-10-CM

## 2022-01-07 DIAGNOSIS — Z13.0 SCREENING FOR DEFICIENCY ANEMIA: ICD-10-CM

## 2022-01-07 DIAGNOSIS — Z11.59 NEED FOR HEPATITIS C SCREENING TEST: Primary | ICD-10-CM

## 2022-01-07 DIAGNOSIS — Z13.220 SCREENING FOR HYPERLIPIDEMIA: ICD-10-CM

## 2022-01-26 ENCOUNTER — OFFICE VISIT (OUTPATIENT)
Dept: FAMILY MEDICINE | Facility: CLINIC | Age: 37
End: 2022-01-26
Payer: COMMERCIAL

## 2022-01-26 VITALS
TEMPERATURE: 98 F | SYSTOLIC BLOOD PRESSURE: 114 MMHG | DIASTOLIC BLOOD PRESSURE: 70 MMHG | HEIGHT: 67 IN | OXYGEN SATURATION: 98 % | WEIGHT: 186 LBS | HEART RATE: 55 BPM | BODY MASS INDEX: 29.19 KG/M2

## 2022-01-26 DIAGNOSIS — F43.23 ADJUSTMENT DISORDER WITH MIXED ANXIETY AND DEPRESSED MOOD: ICD-10-CM

## 2022-01-26 DIAGNOSIS — Z00.00 ROUTINE GENERAL MEDICAL EXAMINATION AT A HEALTH CARE FACILITY: Primary | ICD-10-CM

## 2022-01-26 DIAGNOSIS — Z13.0 SCREENING FOR DEFICIENCY ANEMIA: ICD-10-CM

## 2022-01-26 DIAGNOSIS — Z12.4 CERVICAL CANCER SCREENING: ICD-10-CM

## 2022-01-26 DIAGNOSIS — Z13.220 SCREENING FOR HYPERLIPIDEMIA: ICD-10-CM

## 2022-01-26 DIAGNOSIS — Z11.59 NEED FOR HEPATITIS C SCREENING TEST: ICD-10-CM

## 2022-01-26 DIAGNOSIS — R73.01 ELEVATED FASTING GLUCOSE: ICD-10-CM

## 2022-01-26 DIAGNOSIS — M54.16 ACUTE RIGHT LUMBAR RADICULOPATHY: ICD-10-CM

## 2022-01-26 DIAGNOSIS — Z13.1 SCREENING FOR DIABETES MELLITUS (DM): ICD-10-CM

## 2022-01-26 LAB
ALBUMIN SERPL-MCNC: 4.2 G/DL (ref 3.4–5)
ALP SERPL-CCNC: 39 U/L (ref 40–150)
ALT SERPL W P-5'-P-CCNC: 29 U/L (ref 0–50)
ANION GAP SERPL CALCULATED.3IONS-SCNC: 3 MMOL/L (ref 3–14)
AST SERPL W P-5'-P-CCNC: 17 U/L (ref 0–45)
BILIRUB SERPL-MCNC: 0.4 MG/DL (ref 0.2–1.3)
BUN SERPL-MCNC: 14 MG/DL (ref 7–30)
CALCIUM SERPL-MCNC: 9.4 MG/DL (ref 8.5–10.1)
CHLORIDE BLD-SCNC: 104 MMOL/L (ref 94–109)
CHOLEST SERPL-MCNC: 243 MG/DL
CO2 SERPL-SCNC: 31 MMOL/L (ref 20–32)
CREAT SERPL-MCNC: 0.74 MG/DL (ref 0.52–1.04)
ERYTHROCYTE [DISTWIDTH] IN BLOOD BY AUTOMATED COUNT: 13.4 % (ref 10–15)
FASTING STATUS PATIENT QL REPORTED: YES
GFR SERPL CREATININE-BSD FRML MDRD: >90 ML/MIN/1.73M2
GLUCOSE BLD-MCNC: 102 MG/DL (ref 70–99)
HBA1C MFR BLD: 4.9 % (ref 0–5.6)
HCT VFR BLD AUTO: 38.2 % (ref 35–47)
HCV AB SERPL QL IA: NONREACTIVE
HDLC SERPL-MCNC: 66 MG/DL
HGB BLD-MCNC: 11.9 G/DL (ref 11.7–15.7)
LDLC SERPL CALC-MCNC: 158 MG/DL
MCH RBC QN AUTO: 31.2 PG (ref 26.5–33)
MCHC RBC AUTO-ENTMCNC: 31.2 G/DL (ref 31.5–36.5)
MCV RBC AUTO: 100 FL (ref 78–100)
NONHDLC SERPL-MCNC: 177 MG/DL
PLATELET # BLD AUTO: 261 10E3/UL (ref 150–450)
POTASSIUM BLD-SCNC: 4.3 MMOL/L (ref 3.4–5.3)
PROT SERPL-MCNC: 7.8 G/DL (ref 6.8–8.8)
RBC # BLD AUTO: 3.82 10E6/UL (ref 3.8–5.2)
SODIUM SERPL-SCNC: 138 MMOL/L (ref 133–144)
TRIGL SERPL-MCNC: 95 MG/DL
WBC # BLD AUTO: 5.4 10E3/UL (ref 4–11)

## 2022-01-26 PROCEDURE — 80053 COMPREHEN METABOLIC PANEL: CPT | Performed by: FAMILY MEDICINE

## 2022-01-26 PROCEDURE — 85027 COMPLETE CBC AUTOMATED: CPT | Performed by: FAMILY MEDICINE

## 2022-01-26 PROCEDURE — 99214 OFFICE O/P EST MOD 30 MIN: CPT | Mod: 25 | Performed by: FAMILY MEDICINE

## 2022-01-26 PROCEDURE — G0145 SCR C/V CYTO,THINLAYER,RESCR: HCPCS | Performed by: FAMILY MEDICINE

## 2022-01-26 PROCEDURE — 36415 COLL VENOUS BLD VENIPUNCTURE: CPT | Performed by: FAMILY MEDICINE

## 2022-01-26 PROCEDURE — 83036 HEMOGLOBIN GLYCOSYLATED A1C: CPT | Performed by: FAMILY MEDICINE

## 2022-01-26 PROCEDURE — 87624 HPV HI-RISK TYP POOLED RSLT: CPT | Performed by: FAMILY MEDICINE

## 2022-01-26 PROCEDURE — 80061 LIPID PANEL: CPT | Performed by: FAMILY MEDICINE

## 2022-01-26 PROCEDURE — 86803 HEPATITIS C AB TEST: CPT | Performed by: FAMILY MEDICINE

## 2022-01-26 PROCEDURE — 96127 BRIEF EMOTIONAL/BEHAV ASSMT: CPT | Performed by: FAMILY MEDICINE

## 2022-01-26 PROCEDURE — 99395 PREV VISIT EST AGE 18-39: CPT | Performed by: FAMILY MEDICINE

## 2022-01-26 RX ORDER — SERTRALINE HYDROCHLORIDE 100 MG/1
100 TABLET, FILM COATED ORAL DAILY
Qty: 90 TABLET | Refills: 1 | Status: SHIPPED | OUTPATIENT
Start: 2022-01-26 | End: 2022-08-22 | Stop reason: SINTOL

## 2022-01-26 RX ORDER — PREDNISONE 20 MG/1
TABLET ORAL
Qty: 20 TABLET | Refills: 0 | Status: SHIPPED | OUTPATIENT
Start: 2022-01-26 | End: 2022-02-14

## 2022-01-26 RX ORDER — GABAPENTIN 100 MG/1
100 CAPSULE ORAL AT BEDTIME
Qty: 30 CAPSULE | Refills: 0 | Status: CANCELLED | OUTPATIENT
Start: 2022-01-26

## 2022-01-26 ASSESSMENT — ENCOUNTER SYMPTOMS
HEMATOCHEZIA: 0
NAUSEA: 0
CONSTIPATION: 0
SORE THROAT: 0
WEAKNESS: 0
DYSURIA: 0
ABDOMINAL PAIN: 0
HEADACHES: 0
FREQUENCY: 0
SHORTNESS OF BREATH: 0
CHILLS: 0
HEMATURIA: 0
DIARRHEA: 0
JOINT SWELLING: 0
BREAST MASS: 0
ARTHRALGIAS: 0
MYALGIAS: 1
NERVOUS/ANXIOUS: 0
COUGH: 0
FEVER: 0
HEARTBURN: 0
EYE PAIN: 0
PALPITATIONS: 0
DIZZINESS: 0
PARESTHESIAS: 0

## 2022-01-26 ASSESSMENT — ANXIETY QUESTIONNAIRES
GAD7 TOTAL SCORE: 4
2. NOT BEING ABLE TO STOP OR CONTROL WORRYING: NOT AT ALL
1. FEELING NERVOUS, ANXIOUS, OR ON EDGE: SEVERAL DAYS
IF YOU CHECKED OFF ANY PROBLEMS ON THIS QUESTIONNAIRE, HOW DIFFICULT HAVE THESE PROBLEMS MADE IT FOR YOU TO DO YOUR WORK, TAKE CARE OF THINGS AT HOME, OR GET ALONG WITH OTHER PEOPLE: NOT DIFFICULT AT ALL
7. FEELING AFRAID AS IF SOMETHING AWFUL MIGHT HAPPEN: NOT AT ALL
5. BEING SO RESTLESS THAT IT IS HARD TO SIT STILL: NOT AT ALL
3. WORRYING TOO MUCH ABOUT DIFFERENT THINGS: SEVERAL DAYS
6. BECOMING EASILY ANNOYED OR IRRITABLE: SEVERAL DAYS

## 2022-01-26 ASSESSMENT — MIFFLIN-ST. JEOR: SCORE: 1543.38

## 2022-01-26 ASSESSMENT — PATIENT HEALTH QUESTIONNAIRE - PHQ9
SUM OF ALL RESPONSES TO PHQ QUESTIONS 1-9: 3
5. POOR APPETITE OR OVEREATING: SEVERAL DAYS

## 2022-01-26 ASSESSMENT — PAIN SCALES - GENERAL: PAINLEVEL: MODERATE PAIN (4)

## 2022-01-26 NOTE — PROGRESS NOTES
SUBJECTIVE:   CC: Judei Guallpa is an 37 year old woman who presents for preventive health visit.     {Split Bill scripting  The purpose of this visit is to discuss your medical history and prevent health problems before you are sick. You may be responsible for a co-pay, coinsurance, or deductible if your visit today includes services such as checking on a sore throat, having an x-ray or lab test, or treating and evaluating a new or existing condition :717603}  {Patient advised of split billing (Optional):687899}  HPI  {Add if <65 person on Medicare  - Required Questions (Optional):787032}  {Outside tests to abstract? :709119}    {additional problems to add (Optional):356677}    Today's PHQ-2 Score:   PHQ-2 ( 1999 Pfizer) 1/26/2022   Q1: Little interest or pleasure in doing things 1   Q2: Feeling down, depressed or hopeless 0   PHQ-2 Score 1   PHQ-2 Total Score (12-17 Years)- Positive if 3 or more points; Administer PHQ-A if positive -   Q1: Little interest or pleasure in doing things Several days   Q2: Feeling down, depressed or hopeless Not at all   PHQ-2 Score 1       Abuse: Current or Past (Physical, Sexual or Emotional) - { :755351}  Do you feel safe in your environment? { :465852}        Social History     Tobacco Use     Smoking status: Never Smoker     Smokeless tobacco: Never Used   Substance Use Topics     Alcohol use: Yes     Comment: socially     {Rooming Staff- Complete this question if Prescreen response is not shown below for today's visit. If you drink alcohol do you typically have >3 drinks per day or >7 drinks per week? (Optional):831970}    Alcohol Use 1/26/2022   Prescreen: >3 drinks/day or >7 drinks/week? Yes   Prescreen: >3 drinks/day or >7 drinks/week? -   AUDIT SCORE  9   {add AUDIT responses (Optional) (A score of 7 for adult men is an indication of hazardous drinking; a score of 8 or more is an indication of an alcohol use disorder.  A score of 7 or more for adult women is an  "indication of hazardous drinking or an alchohol use disorder):596744}    Reviewed orders with patient.  Reviewed health maintenance and updated orders accordingly - { :291958::\"Yes\"}  {Chronicprobdata (optional):013457}    Breast Cancer Screening:    FHS-7:   Breast CA Risk Assessment (FHS-7) 1/26/2022   Did any of your first-degree relatives have breast or ovarian cancer? No   Did any of your relatives have bilateral breast cancer? Yes   Did any man in your family have breast cancer? No   Did any woman in your family have breast and ovarian cancer? Yes   Did any woman in your family have breast cancer before age 50 y? No   Do you have 2 or more relatives with breast and/or ovarian cancer? No   Do you have 2 or more relatives with breast and/or bowel cancer? No     {If any of the questions to the BCRA (FHS-7) are answered yes, consider ordering referral for genetic counseling (Optional) :662853::\"click delete button to remove this line now\"}  {AMB Mammogram Decision Support (Optional) :970506}  Pertinent mammograms are reviewed under the imaging tab.    History of abnormal Pap smear: { :633456}     Reviewed and updated as needed this visit by clinical staff  Tobacco  Allergies  Meds   Med Hx  Surg Hx  Fam Hx  Soc Hx       Reviewed and updated as needed this visit by Provider               {HISTORY OPTIONS (Optional):028869}    Review of Systems  {FEMALE ROS (Optional):086302}     OBJECTIVE:   /70 (BP Location: Right arm, Patient Position: Sitting, Cuff Size: Adult Regular)   Pulse 55   Temp 98  F (36.7  C) (Oral)   Ht 1.689 m (5' 6.5\")   Wt 84.4 kg (186 lb)   LMP 12/24/2021   SpO2 98%   BMI 29.57 kg/m    Physical Exam  {Exam Choices (Optional):172368}    {Diagnostic Test Results (Optional):572836::\"Diagnostic Test Results:\",\"Labs reviewed in Epic\"}    ASSESSMENT/PLAN:   {Diag Picklist:921448}    {Patient advised of split billing (Optional):653826}    COUNSELING:  {FEMALE COUNSELING " "MESSAGES:258841::\"Reviewed preventive health counseling, as reflected in patient instructions\"}    Estimated body mass index is 29.57 kg/m  as calculated from the following:    Height as of this encounter: 1.689 m (5' 6.5\").    Weight as of this encounter: 84.4 kg (186 lb).    {Weight Management Plan (ACO) Complete if BMI is abnormal-  Ages 18-64  BMI >24.9.  Age 65+ with BMI <23 or >30 (Optional):211133}    She reports that she has never smoked. She has never used smokeless tobacco.      Counseling Resources:  ATP IV Guidelines  Pooled Cohorts Equation Calculator  Breast Cancer Risk Calculator  BRCA-Related Cancer Risk Assessment: FHS-7 Tool  FRAX Risk Assessment  ICSI Preventive Guidelines  Dietary Guidelines for Americans, 2010  USDA's MyPlate  ASA Prophylaxis  Lung CA Screening    Yissel Fitzpatrick MD  [facility]  "

## 2022-01-26 NOTE — RESULT ENCOUNTER NOTE
Dear Judie,  Your lab test showed normal hemoglobin with no concern for anemia, normal liver and kidney functions and negative hepatitis C screening test.  These are good and reassuring  Your fasting blood sugar is slightly elevated but your diabetes screening test A1c is within normal range, this is good.  Your fasting cholesterol numbers are moderately elevated.   Desired or goal levels are:  TOTAL CHOLESTEROL: Desirable is less than 200.   HDL (Good Cholesterol): Desirable is greater than 40 (for men) greater than 50 (for women).  LDL (Bad Cholesterol): Desirable is less than 130 (or less than 100 if you have heart disease or diabetes). Borderline 130-160.  TRIGLYCERIDES: Desirable is less than 150.  Borderline is 150-200..    As you may know, an elevated cholesterol is one factor that increases your risk for heart disease and stroke. You can improve your cholesterol by controlling the amount and type of fat you eat and by increasing your daily activity level.  Here are some ways to improve your nutrition:  Eat less fat (especially butter, Crisco and other saturated fats)  Buy lean cuts of meat, reduce your portions of red meat or substitute poultry or fish  Use skim milk and low-fat dairy products  Eat no more than 4 egg yolks per week  Avoid fried or fast foods that are high in fat  Eat more fruits and vegetables  As we discussed today, please avoid or limit alcohol but not more than 1 drink per day  Also consider starting or increasing your aerobic activity. Aerobic activity is the best way to improve HDL (good) cholesterol.    Let me know if you have any questions. Take care.  Yissel Fitzpatrick MD
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
## 2022-01-26 NOTE — PATIENT INSTRUCTIONS
"  Preventive Health Recommendations  Female Ages 26 - 39  Yearly exam:   See your health care provider every year in order to    Review health changes.     Discuss preventive care.      Review your medicines if you your doctor has prescribed any.    Until age 30: Get a Pap test every three years (more often if you have had an abnormal result).    After age 30: Talk to your doctor about whether you should have a Pap test every 3 years or have a Pap test with HPV screening every 5 years.   You do not need a Pap test if your uterus was removed (hysterectomy) and you have not had cancer.  You should be tested each year for STDs (sexually transmitted diseases), if you're at risk.   Talk to your provider about how often to have your cholesterol checked.  If you are at risk for diabetes, you should have a diabetes test (fasting glucose).  Shots: Get a flu shot each year. Get a tetanus shot every 10 years.   Nutrition:     Eat at least 5 servings of fruits and vegetables each day.    Eat whole-grain bread, whole-wheat pasta and brown rice instead of white grains and rice.    Get adequate Calcium and Vitamin D.     Lifestyle    Exercise at least 150 minutes a week (30 minutes a day, 5 days of the week). This will help you control your weight and prevent disease.    Limit alcohol to one drink per day.    No smoking.     Wear sunscreen to prevent skin cancer.    See your dentist every six months for an exam and cleaning.    Patient Education     * Sciatica     Sciatica (\"Lumbar Radiculopathy\") causes a pain that spreads from the lower back down into the buttock, hip and leg. Sometimes leg pain can occur without any back pain. Sciatica is due to irritation or pressure on a spinal nerve as it comes out of the spinal canal. This is most often due to pressure on the nerve from a nearby spinal disk (the cartilage cushion between each spinal bone). Other causes include spinal stenosis (narrowing of the spinal canal) and spasm of the " piriformis muscle (a muscle in the buttocks that the sciatic nerve passes through).  Sciatica may begin after a sudden twisting/bending force (such as in a car accident), or sometimes after a simple awkward movement. In either case, muscle spasm is commonly present and can add to the pain.  The diagnosis of sciatica is made from the symptoms and physical exam. Unless you had a physical injury (such as a car accident or fall), X-rays are usually not ordered for the initial evaluation of sciatica because the nerves and disks cannot be seen on an X-ray. Most sciatica (80-90%) gets better with time.  What can I do about my low back pain?  There are three main things you can do to ease low back pain and help it go away.    Stay active! Use positions, movements and exercises. Too much rest can make your symptoms worse.    Use heat or cold packs.    Take medicine as directed.  Using positions, movements and exercises  Research tells us that moving your joints and muscles can help you recover from back pain. Such activity should be simple and gentle.  Use walking to help relieve your discomfort. Try taking a short walk every 3 to 4 hours during the day. Walk for a few minutes inside your home or take longer walks outside, on a treadmill or at a mall. Slowly increase the amount of time you walk. Expect discomfort when you begin, but it should lessen as your back starts to recover.  Finding a position that is comfortable  When your back pain is new, you may find that certain positions will ease your pain. Gently try each of the following positions until you find one that eases your pain. Once you find a position of comfort, use it as often as you like while you recover. Return to your daily routine as soon as possible.    Lie on your back with your legs bent. You can do this by placing a pillow under your knees or lie on the floor and rest your lower legs on the seat of a chair.    Lie on your side with your knees bent and  place a pillow between your knees.    Lie on your stomach over pillows.  Using heat or cold packs  Try cold packs or gentle heat to ease your pain. Use whichever gives you the most relief. Apply the cold pack or heat for 15 minutes at a time, as often as needed.  Taking medicine  If taking over-the-counter medicine:    Take ibuprofen (Advil, Motrin) 600 mg. three times a day as needed for pain.  OR    Take Aleve (naproxen sodium) 220 to 440 mg. two times a day as needed for pain.  If your doctor prescribed medicine, follow the instructions. Stop taking the medicine as soon as you can.  When should I call my doctor?  Your back pain should improve over the first couple of weeks. As it improves, you should be able to return to your normal activities. But call your doctor if:    You have a sudden change in your ability to control?your bladder or bowels.    You begin to feel tingling in your groin or legs.    The pain spreads down your leg and into your foot.    Your toes, feet or leg muscles begin to feel weak.    You feel generally unwell or sick.    Your pain gets worse.  For informational purposes only. Not to replace the advice of your health care provider.  Copyright   2018 [employer] [facility]. All rights reserved.           Patient Education     Understanding Lumbar Radiculopathy    Lumbar radiculopathy is irritation or inflammation of a nerve root in the low back. It causes symptoms that spread out from the back down one or both legs. To understand this condition, it helps to understand the parts of the spine:    Vertebrae. These are bones that stack to form the spine. The lumbar spine contains 5 vertebrae near the bottom of your spine.    Disks. These are soft pads of tissue between the vertebrae. They act as shock absorbers for the spine.    Spinal canal. This is a tunnel formed within the stacked vertebrae. In the lumbar spine, nerves run through this canal.    Nerves. These branch off and leave the  spinal canal, traveling out to parts of the body. As they leave the spinal canal, nerves pass through openings between the vertebrae. The nerve root is the part of the nerve that is closest to the spinal canal.    Sciatic nerve. This is a large nerve formed from several nerve roots in the low back. This nerve extends down the back of the leg to the foot.  With lumbar radiculopathy, nerve roots in the low back become irritated. This leads to pain and symptoms. The sciatic nerve is commonly involved, so the condition is often called sciatica.  What causes lumbar radiculopathy?  Aging, injury, poor posture, extra body weight, and other issues can lead to problems in the low back. These problems may then irritate nerve roots. They include:    Damage to a disk in the lumbar spine. The damaged disk may then press on nearby nerve roots.    Degeneration from wear and tear, and aging. This can lead to narrowing (stenosis) of the openings between the vertebrae. The narrowed openings press on nerve roots as they leave the spinal canal.    Unstable spine. This is when a vertebra slips forward. It can then press on a nerve root.  Other, less common things can put pressure on nerves in the low back. These include diabetes, infection, or a tumor.  Symptoms of lumbar radiculopathy  These include:    Pain in the low back    Pain, numbness, tingling, or weakness that travels into the buttocks, hip, groin, or leg    Muscle spasms in the low back, or leg  Treatment for lumbar radiculopathy  In most cases, your healthcare provider will first try treatments that help relieve symptoms. These may include:    Prescription and over-the-counter pain medicines. These help relieve pain, swelling, and irritation.    Limits on positions and activities that increase pain. But lying in bed or avoiding all movement is only recommended for a short period of time.    Physical therapy, including exercises and stretches. This helps decrease pain and  increase movement and function.    Steroid shots into the lower back. This may help relieve symptoms for a time.    Weight-loss program. If you are overweight, losing extra pounds (kilograms) may help relieve symptoms.  In some cases, you may need surgery to fix the underlying problem. This depends on the cause, the symptoms, and how long the pain has lasted.  Possible complications  Over time, an irritated and inflamed nerve may become damaged. This may lead to long-lasting (permanent) numbness or weakness in your legs and feet. If symptoms change suddenly or get worse, be sure to let your healthcare provider know.  When to call your healthcare provider  Call your healthcare provider right away if you have any of these:    New pain or pain that gets worse    New or increasing weakness, tingling, or numbness in your leg or foot    Problems controlling your bladder or bowel  [employer] last reviewed this educational content on 2/1/2020 2000-2021 [employer]. All rights reserved. This information is not intended as a substitute for professional medical care. Always follow your healthcare professional's instructions.           Patient Education     Possible Causes of Low Back or Leg Pain    BIG: The symptoms in your back or leg may be due to pressure on a nerve. This pressure may be caused by a damaged disk or by abnormal bone growth. Either way, you may feel pain, burning, tingling, or numbness. If you have pressure on a nerve that connects to the sciatic nerve, pain may shoot down your leg.    Pressure from the disk  Constant wear and tear can weaken a disk over time and cause back pain. The disk can then be damaged by a sudden movement or injury. If its soft center starts to bulge, the disk may press on a nerve. Or the outside of the disk may tear, and the soft center may squeeze through and pinch a nerve.    Pressure from bone  As a disk wears out, the vertebrae right above and below the disk start  to touch. This can put pressure on a nerve. Often, abnormal bone (called bone spurs) grows where the vertebrae rub against each other. This can cause the foramen or the spinal canal to narrow (called stenosis) and press against a nerve.  [employer] last reviewed this educational content on 3/1/2018    3562-1438 [employer]. All rights reserved. This information is not intended as a substitute for professional medical care. Always follow your healthcare professional's instructions.           Patient Education     Lumbar Stretch (Flexibility)    1. Lie on your back on the floor, with your knees bent and your feet flat on the floor. Don t press your neck or lower back to the floor.  2. Pull one knee up toward your chest. Clasp your hands under your thigh to help pull.  3. Hold for 30 to 60 seconds. Lower your leg back down to the floor.  4. Repeat 2 times, or as instructed.  5. Switch legs and repeat.  [employer] last reviewed this educational content on 3/1/2020    6471-4974 [employer]. All rights reserved. This information is not intended as a substitute for professional medical care. Always follow your healthcare professional's instructions.           Patient Education     Prone Multifidus Activation (Strength)    6. Lie on your belly on the floor with a pillow under your hips. You can lie on a mat or towel.  7. Hold your arms straight along your sides.  8. Slowly raise your chest off the floor, gently pulling your arms behind you. Keep your neck straight and your ears in line with your shoulders. Hold for 30 seconds, then lie back down.  9. Repeat 5 times, or as instructed.  [employer] last reviewed this educational content on 6/1/2019 2000-2021 [employer]. All rights reserved. This information is not intended as a substitute for professional medical care. Always follow your healthcare professional's instructions.           Patient Education     Quadriceps Stretch  (Flexibility)    10. Stand up straight and hold onto a wall, sturdy chair, railing, or table with your right hand.  11. Bend your left leg at the knee behind you, and grab your ankle with your left hand. Pull your left heel toward your buttocks. Don t arch your back.  12. Hold for 30 to 60 seconds. Repeat 2 times.  13. Switch legs and repeat.  [employer] last reviewed this educational content on 2/1/2020 2000-2021 [employer]. All rights reserved. This information is not intended as a substitute for professional medical care. Always follow your healthcare professional's instructions.           Patient Education     Back Exercises: Lower Back Rotation    To start, lie on your back with your knees bent and feet flat on the floor. Don t press your neck or lower back to the floor. Breathe deeply. You should feel comfortable and relaxed in this position.    Drop both knees to one side. Turn your head to the other side. Keep your shoulders flat on the floor.    Do not push through pain.    Hold for 20 seconds.    Slowly switch sides.    Repeat 2 to 5 times.  [facility] last reviewed this educational content on 3/1/2018    6349-0630 [employer]. All rights reserved. This information is not intended as a substitute for professional medical care. Always follow your healthcare professional's instructions.           Patient Education     Hip Rotation (Flexibility)    These instructions are for the right hip. Switch sides for your left hip.  19. Lie on your back on the floor, with your knees bent and feet flat on the floor. Don t press your neck or lower back to the floor.  20. Rest your right ankle on your left knee.  21. Place a towel around the back of your left thigh. Pull on the ends of the towel to pull your left knee toward your chest. Feel the stretch in your buttocks.  22. Hold for 30 to 60 seconds. Lower your leg back down.  23. Repeat 2 to 3 times, or as instructed.  24. Switch legs and  repeat.   25. Do this 3 times a day, or as instructed.  [employer] last reviewed this educational content on 2/1/2020 2000-2021 [employer]. All rights reserved. This information is not intended as a substitute for professional medical care. Always follow your healthcare professional's instructions.

## 2022-01-27 ASSESSMENT — ANXIETY QUESTIONNAIRES: GAD7 TOTAL SCORE: 4

## 2022-01-28 LAB
BKR LAB AP GYN ADEQUACY: NORMAL
BKR LAB AP GYN INTERPRETATION: NORMAL
BKR LAB AP HPV REFLEX: NORMAL
BKR LAB AP PREVIOUS ABNORMAL: NORMAL
PATH REPORT.COMMENTS IMP SPEC: NORMAL
PATH REPORT.COMMENTS IMP SPEC: NORMAL
PATH REPORT.RELEVANT HX SPEC: NORMAL

## 2022-02-01 LAB
HUMAN PAPILLOMA VIRUS 16 DNA: NEGATIVE
HUMAN PAPILLOMA VIRUS 18 DNA: NEGATIVE
HUMAN PAPILLOMA VIRUS FINAL DIAGNOSIS: NORMAL
HUMAN PAPILLOMA VIRUS OTHER HR: NEGATIVE

## 2022-02-14 ENCOUNTER — E-VISIT (OUTPATIENT)
Dept: FAMILY MEDICINE | Facility: CLINIC | Age: 37
End: 2022-02-14
Payer: COMMERCIAL

## 2022-02-14 DIAGNOSIS — R05.9 COUGH: Primary | ICD-10-CM

## 2022-02-14 PROCEDURE — 99421 OL DIG E/M SVC 5-10 MIN: CPT | Performed by: FAMILY MEDICINE

## 2022-02-14 RX ORDER — CODEINE PHOSPHATE/GUAIFENESIN 10-100MG/5
5 LIQUID (ML) ORAL EVERY 6 HOURS PRN
Qty: 120 ML | Refills: 0 | Status: SHIPPED | OUTPATIENT
Start: 2022-02-14 | End: 2022-08-22

## 2022-02-18 ENCOUNTER — E-VISIT (OUTPATIENT)
Dept: FAMILY MEDICINE | Facility: CLINIC | Age: 37
End: 2022-02-18
Payer: COMMERCIAL

## 2022-02-18 DIAGNOSIS — R05.9 COUGH: ICD-10-CM

## 2022-02-18 DIAGNOSIS — J20.9 ACUTE BRONCHITIS, UNSPECIFIED ORGANISM: Primary | ICD-10-CM

## 2022-02-18 PROCEDURE — 99421 OL DIG E/M SVC 5-10 MIN: CPT | Performed by: FAMILY MEDICINE

## 2022-02-21 RX ORDER — BENZONATATE 100 MG/1
100 CAPSULE ORAL 3 TIMES DAILY PRN
Qty: 30 CAPSULE | Refills: 0 | Status: SHIPPED | OUTPATIENT
Start: 2022-02-21 | End: 2022-08-22

## 2022-02-21 NOTE — PATIENT INSTRUCTIONS
"    Dear Judie Guallpa    After reviewing your responses, I've been able to diagnose you with \"Bronchitis\" which is a common infection of your lungs. While this is most commonly caused by a virus, the symptoms you have given suggest you should be treated with antibiotics.     I have sent antibiotics and cough suppressant to your pharmacy to treat this infection.     It is important that you take all of your prescribed medication even if your symptoms are improving after a few doses. Taking all of your medicine helps prevent the symptoms from returning.     If your symptoms worsen, you develop chest pain or shortness of breath, fevers over 101, or are not improving in 5 days, please contact your primary care provider for an appointment or visit any of our convenient Walk-in Care or Urgent Care Centers to be seen which can be found on our website here.    Thanks again for choosing us as your health care partner,    Yissel Fitzpatrick MD    "

## 2022-08-01 NOTE — TELEPHONE ENCOUNTER
DIAGNOSIS: Right arm elbow pain has increased to shoulder to wrist numbness/pain   APPOINTMENT DATE: 08/16/2022   NOTES STATUS DETAILS   OFFICE NOTE from referring provider N/A    OFFICE NOTE from other specialist N/A    DISCHARGE SUMMARY from hospital N/A    DISCHARGE REPORT from the ER N/A    OPERATIVE REPORT N/A    EMG report N/A    MEDICATION LIST N/A    MRI N/A    DEXA (osteoporosis/bone health) N/A    CT SCAN N/A    XRAYS (IMAGES & REPORTS) N/A      Patient states pain started in June at her elbow. She was lifting weights at the gym. Pain has increased and now her whole arm will go numb, tingling in fingers. Could not sleep. Never been seen for this. No images  Cira Lynn on 8/4/2022 at 9:35 AM

## 2022-08-16 ENCOUNTER — PRE VISIT (OUTPATIENT)
Dept: ORTHOPEDICS | Facility: CLINIC | Age: 37
End: 2022-08-16

## 2022-08-19 ASSESSMENT — ANXIETY QUESTIONNAIRES
GAD7 TOTAL SCORE: 10
7. FEELING AFRAID AS IF SOMETHING AWFUL MIGHT HAPPEN: NOT AT ALL
1. FEELING NERVOUS, ANXIOUS, OR ON EDGE: MORE THAN HALF THE DAYS
8. IF YOU CHECKED OFF ANY PROBLEMS, HOW DIFFICULT HAVE THESE MADE IT FOR YOU TO DO YOUR WORK, TAKE CARE OF THINGS AT HOME, OR GET ALONG WITH OTHER PEOPLE?: SOMEWHAT DIFFICULT
GAD7 TOTAL SCORE: 10
4. TROUBLE RELAXING: MORE THAN HALF THE DAYS
2. NOT BEING ABLE TO STOP OR CONTROL WORRYING: SEVERAL DAYS
3. WORRYING TOO MUCH ABOUT DIFFERENT THINGS: MORE THAN HALF THE DAYS
IF YOU CHECKED OFF ANY PROBLEMS ON THIS QUESTIONNAIRE, HOW DIFFICULT HAVE THESE PROBLEMS MADE IT FOR YOU TO DO YOUR WORK, TAKE CARE OF THINGS AT HOME, OR GET ALONG WITH OTHER PEOPLE: SOMEWHAT DIFFICULT
6. BECOMING EASILY ANNOYED OR IRRITABLE: NEARLY EVERY DAY
5. BEING SO RESTLESS THAT IT IS HARD TO SIT STILL: NOT AT ALL
7. FEELING AFRAID AS IF SOMETHING AWFUL MIGHT HAPPEN: NOT AT ALL
GAD7 TOTAL SCORE: 10

## 2022-08-22 ENCOUNTER — VIRTUAL VISIT (OUTPATIENT)
Dept: FAMILY MEDICINE | Facility: CLINIC | Age: 37
End: 2022-08-22
Payer: COMMERCIAL

## 2022-08-22 DIAGNOSIS — F43.23 ADJUSTMENT DISORDER WITH MIXED ANXIETY AND DEPRESSED MOOD: Primary | ICD-10-CM

## 2022-08-22 PROCEDURE — 99213 OFFICE O/P EST LOW 20 MIN: CPT | Mod: 95 | Performed by: NURSE PRACTITIONER

## 2022-08-22 RX ORDER — VENLAFAXINE HYDROCHLORIDE 75 MG/1
75 CAPSULE, EXTENDED RELEASE ORAL DAILY
Qty: 30 CAPSULE | Refills: 0 | Status: SHIPPED | OUTPATIENT
Start: 2022-08-22 | End: 2022-09-20

## 2022-08-22 ASSESSMENT — ANXIETY QUESTIONNAIRES: GAD7 TOTAL SCORE: 10

## 2022-08-22 NOTE — PATIENT INSTRUCTIONS
Sloan Dimas, I was not able to sent an electronic medication to the [facility] pharmacy. I ended up sending to [facility] off [address]. I hope that was okay. If you need it sent to a different pharmacy please let me know.  Thank you,   GLADYS Limon, NP-C  [facility]

## 2022-08-22 NOTE — PROGRESS NOTES
"Judie is a 37 year old who is being evaluated via a billable video visit.      How would you like to obtain your AVS? MyChart  If the video visit is dropped, the invitation should be resent by: Text to cell phone: [phone]  Will anyone else be joining your video visit? No      Assessment & Plan     Adjustment disorder with mixed anxiety and depressed mood  Noting weight gain with sertraline, she has been off it for over a month and noting increased irrtability and wanting to go to bed early. Trial this, follow up with a provider in a month, virtual okay  - venlafaxine (EFFEXOR XR) 75 MG 24 hr capsule; Take 1 capsule (75 mg) by mouth daily       BMI:   Estimated body mass index is 29.57 kg/m  as calculated from the following:    Height as of 1/26/22: 1.689 m (5' 6.5\").    Weight as of 1/26/22: 84.4 kg (186 lb).     Return in about 4 weeks (around 9/19/2022) for Medication check, Phone or Video visit prince.    GLADYS Limon, NP-C  [facility]    Kassandra Dimas is a 37 year old presenting for the following health issues:  No chief complaint on file.    History of Present Illness       Mental Health Follow-up:  Patient presents to follow-up on Anxiety.    Patient's anxiety since last visit has been:  Worse  The patient is not having other symptoms associated with anxiety.  Any significant life events: No  Patient is not feeling anxious or having panic attacks.  Patient has no concerns about alcohol or drug use.    She eats 2-3 servings of fruits and vegetables daily.She consumes 0 sweetened beverage(s) daily.She exercises with enough effort to increase her heart rate 30 to 60 minutes per day.  She exercises with enough effort to increase her heart rate 3 or less days per week. She is missing 2 dose(s) of medications per week.  She is not taking prescribed medications regularly due to remembering to take.  Today's VENKATESH-7 Score: 10     About 1.5 yr ago was started on sertraline, she then " started to gain a lot of weight. She decreased it/stopped it, then she noted she was snapping more at kids and  for no reason. She feels she needs something. She would like to try something else. She feels with nutrition and exercise she is better now. She goes to a circuit training class 2-3 times a week, plus kids and walking the dogs. She also hurt her arm lifting recently.     Tried wellbutrin but didn't help much.           Review of Systems   Constitutional, HEENT, cardiovascular, pulmonary, psych as above, systems are negative, except as otherwise noted.      Objective           Vitals:  No vitals were obtained today due to virtual visit.    Physical Exam   GENERAL: Healthy, alert and no distress  EYES: Eyes grossly normal to inspection.  No discharge or erythema, or obvious scleral/conjunctival abnormalities.  RESP: No audible wheeze, cough, or visible cyanosis.  No visible retractions or increased work of breathing.    SKIN: Visible skin clear. No significant rash, abnormal pigmentation or lesions.  NEURO: Cranial nerves grossly intact.  Mentation and speech appropriate for age.  PSYCH: Mentation appears normal, affect normal, tearful at times, judgement and insight intact, normal speech and appearance well-groomed.    No labs reviewed            Video-Visit Details    Video Start Time: 2:27 PM    Type of service:  Video Visit    Video End Time:2:37 PM    Originating Location (pt. Location): Home    Distant Location (provider location):  home secure location    Platform used for Video Visit: Yung Torres

## 2022-08-24 ENCOUNTER — OFFICE VISIT (OUTPATIENT)
Dept: ORTHOPEDICS | Facility: CLINIC | Age: 37
End: 2022-08-24
Payer: COMMERCIAL

## 2022-08-24 ENCOUNTER — ANCILLARY PROCEDURE (OUTPATIENT)
Dept: GENERAL RADIOLOGY | Facility: CLINIC | Age: 37
End: 2022-08-24
Attending: FAMILY MEDICINE
Payer: COMMERCIAL

## 2022-08-24 ENCOUNTER — TELEPHONE (OUTPATIENT)
Dept: ORTHOPEDICS | Facility: CLINIC | Age: 37
End: 2022-08-24

## 2022-08-24 DIAGNOSIS — M79.601 PAIN AND NUMBNESS OF RIGHT UPPER EXTREMITY: ICD-10-CM

## 2022-08-24 DIAGNOSIS — R20.0 PAIN AND NUMBNESS OF RIGHT UPPER EXTREMITY: ICD-10-CM

## 2022-08-24 DIAGNOSIS — M79.601 PAIN AND NUMBNESS OF RIGHT UPPER EXTREMITY: Primary | ICD-10-CM

## 2022-08-24 DIAGNOSIS — R20.0 PAIN AND NUMBNESS OF RIGHT UPPER EXTREMITY: Primary | ICD-10-CM

## 2022-08-24 PROCEDURE — 72040 X-RAY EXAM NECK SPINE 2-3 VW: CPT | Performed by: RADIOLOGY

## 2022-08-24 PROCEDURE — 99204 OFFICE O/P NEW MOD 45 MIN: CPT | Performed by: FAMILY MEDICINE

## 2022-08-24 RX ORDER — TIZANIDINE 2 MG/1
2-4 TABLET ORAL 3 TIMES DAILY
Qty: 30 TABLET | Refills: 1 | Status: SHIPPED | OUTPATIENT
Start: 2022-08-24 | End: 2022-10-17

## 2022-08-24 ASSESSMENT — PAIN SCALES - GENERAL: PAINLEVEL: EXTREME PAIN (8)

## 2022-08-24 NOTE — NURSING NOTE
Chief Complaint   Patient presents with     Right Forearm - New Patient, Pain     Right Elbow - Pain, New Patient     Right Shoulder - New Patient, Pain       There were no vitals filed for this visit.    There is no height or weight on file to calculate BMI.      FLORIDALMA Seth NREMT

## 2022-08-24 NOTE — TELEPHONE ENCOUNTER
Left Voicemail (1st Attempt) for the patient to call back and schedule the following:    Appointment type: return   Provider: dr. Vasquez   Return date: couple days after mri   Specialty phone number: [phone]   Additional appointment(s) needed: physical therapy   Additonal Notes: follow up with Dr. Vasquez can be virtual     [facility] Procedure   Orthopedics, Podiatry, Sports Medicine, ENT/Eye Specialties  [facility]   [phone]

## 2022-08-24 NOTE — PROGRESS NOTES
[facility]  SPORTS MEDICINE CLINIC VISIT     Aug 24, 2022        ASSESSMENT & PLAN    37-year-old with symptoms initially sounding suspicious for lateral epicondylitis which is progressed to include the entire right upper extremity and roughly a C6 distribution radiating from the neck down to the thumb.    Reviewed imaging and assessment with patient in detail  She is provided with a muscle relaxant for treatment of pain and tightness particularly at night  She continue to use OTC Tylenol and ibuprofen as needed  I have recommended that she begin a course of physical therapy targeting her neck.  Additionally given the duration and severity of her current symptoms and discomfort would recommend further evaluation with MRI.    Jorge A Vasquez MD  [facility] SPORTS MEDICINE [facility]    -----  Chief Complaint   Patient presents with     Right Forearm - New Patient, Pain     Right Elbow - Pain, New Patient     Right Shoulder - New Patient, Pain       SUBJECTIVE  Judie Guallpa is a/an 37 year old female who is seen as a self referral for evaluation of  Right arm pain and numbness.     The patient is seen with their .  The patient is Right handed    Onset: 3 month(s) ago. Patient describes injury as probably from weight lifting.  Initially had pain over the right elbow.  Has a neighbor that his physical therapist who provided with some exercises for lateral epicondylitis which seem to help.  However her pain seems to be worsening and now radiates from her right lateral neck down her arm to her hand in the thumb area.  Location of Pain: right arm and shoulder  Worsened by: use  Better with: nothing  Treatments tried: rest/activity avoidance, ice, Tylenol and home exercises  Associated symptoms: numbness    Orthopedic/Surgical history: NO  Social History/Occupation: Weight lifting      REVIEW OF SYSTEMS:    Do you have fever, chills, weight loss? No    Do you have any vision problems?  No    Do you have any chest pain or edema? No    Do you have any shortness of breath or wheezing?  No    Do you have stomach problems? No    Do you have any numbness or focal weakness? Yes, right arm    Do you have diabetes? No    Do you have problems with bleeding or clotting? No    Do you have an rashes or other skin lesions? No    OBJECTIVE:  There were no vitals taken for this visit.     General: alert, pleasant, no distress  Neck/Spine: no TTP of spinous processes in cervical spine. Full ROM with flexion, extension, rotation, tilting  with pain and stiffness with rotation and sidebending in particular.  positive  TTP along paraspinous muscles and upper trapezius musculature on the right.  positive  Periscapular pain.  positive  tightness in this area. No noted bruising or skin discoloration. Spurlings negative  Neurologic: SILT throughout upper extremities. Strength 5/5 and symmetric throughout upper extremities.   Upper Extremities: warm, well perfused, no edema. Full ROM without pain in shoulder, elbow, wrist, and hand.  Tender to palpation over the right lateral epicondyle and pain with wrist extension against resistance.  Good capillary refill bilaterally        RADIOLOGY:    2 view xrays of cervical spine performed and reviewed independently demonstrating loss of normal cervical lordosis.  No significant DJD.  No acute findings. See EMR for formal radiology report.

## 2022-08-24 NOTE — LETTER
8/24/2022         RE: Judie Guallpa  [address] [facility] [address]        Dear Colleague,    Thank you for referring your patient, Judie Guallpa, to the [facility] SPORTS MEDICINE [facility]. Please see a copy of my visit note below.      [facility]  SPORTS MEDICINE CLINIC VISIT     Aug 24, 2022        ASSESSMENT & PLAN    37-year-old with symptoms initially sounding suspicious for lateral epicondylitis which is progressed to include the entire right upper extremity and roughly a C6 distribution radiating from the neck down to the thumb.    Reviewed imaging and assessment with patient in detail  She is provided with a muscle relaxant for treatment of pain and tightness particularly at night  She continue to use OTC Tylenol and ibuprofen as needed  I have recommended that she begin a course of physical therapy targeting her neck.  Additionally given the duration and severity of her current symptoms and discomfort would recommend further evaluation with MRI.    Jorge A Vasquez MD  [facility] MEDICINE [facility]    -----  Chief Complaint   Patient presents with     Right Forearm - New Patient, Pain     Right Elbow - Pain, New Patient     Right Shoulder - New Patient, Pain       SUBJECTIVE  Judie Guallpa is a/an 37 year old female who is seen as a self referral for evaluation of  Right arm pain and numbness.     The patient is seen with their .  The patient is Right handed    Onset: 3 month(s) ago. Patient describes injury as probably from weight lifting.  Initially had pain over the right elbow.  Has a neighbor that his physical therapist who provided with some exercises for lateral epicondylitis which seem to help.  However her pain seems to be worsening and now radiates from her right lateral neck down her arm to her hand in the thumb area.  Location of Pain: right arm and shoulder  Worsened by: use  Better with: nothing  Treatments tried:  rest/activity avoidance, ice, Tylenol and home exercises  Associated symptoms: numbness    Orthopedic/Surgical history: NO  Social History/Occupation: Weight lifting      REVIEW OF SYSTEMS:    Do you have fever, chills, weight loss? No    Do you have any vision problems? No    Do you have any chest pain or edema? No    Do you have any shortness of breath or wheezing?  No    Do you have stomach problems? No    Do you have any numbness or focal weakness? Yes, right arm    Do you have diabetes? No    Do you have problems with bleeding or clotting? No    Do you have an rashes or other skin lesions? No    OBJECTIVE:  There were no vitals taken for this visit.     General: alert, pleasant, no distress  Neck/Spine: no TTP of spinous processes in cervical spine. Full ROM with flexion, extension, rotation, tilting  with pain and stiffness with rotation and sidebending in particular.  positive  TTP along paraspinous muscles and upper trapezius musculature on the right.  positive  Periscapular pain.  positive  tightness in this area. No noted bruising or skin discoloration. Spurlings negative  Neurologic: SILT throughout upper extremities. Strength 5/5 and symmetric throughout upper extremities.   Upper Extremities: warm, well perfused, no edema. Full ROM without pain in shoulder, elbow, wrist, and hand.  Tender to palpation over the right lateral epicondyle and pain with wrist extension against resistance.  Good capillary refill bilaterally        RADIOLOGY:    2 view xrays of cervical spine performed and reviewed independently demonstrating loss of normal cervical lordosis.  No significant DJD.  No acute findings. See EMR for formal radiology report.                Again, thank you for allowing me to participate in the care of your patient.        Sincerely,        Jorge A Vasquez MD

## 2022-08-29 ENCOUNTER — THERAPY VISIT (OUTPATIENT)
Dept: PHYSICAL THERAPY | Facility: CLINIC | Age: 37
End: 2022-08-29
Attending: FAMILY MEDICINE
Payer: COMMERCIAL

## 2022-08-29 DIAGNOSIS — R20.0 PAIN AND NUMBNESS OF RIGHT UPPER EXTREMITY: ICD-10-CM

## 2022-08-29 DIAGNOSIS — M79.601 PAIN AND NUMBNESS OF RIGHT UPPER EXTREMITY: ICD-10-CM

## 2022-08-29 PROCEDURE — 97110 THERAPEUTIC EXERCISES: CPT | Mod: GP

## 2022-08-29 PROCEDURE — 97161 PT EVAL LOW COMPLEX 20 MIN: CPT | Mod: GP

## 2022-08-29 NOTE — PROGRESS NOTES
Physical Therapy Initial Evaluation  Subjective:  The history is provided by the patient. No  was used.   Patient Health History  Judie Guallpa being seen for Evaluation right arm.     Problem began: 6/1/2022.   Problem occurred: Unsure   Pain is reported as 6/10 on pain scale.  General health as reported by patient is good.  Pertinent medical history includes: numbness/tingling and pain at night/rest.     Medical allergies: none.   Surgeries include:  Other. Other surgery history details: appendicitis.    Current medications:  Anti-depressants.                         Therapist Generated HPI Evaluation  Problem details: Initially had right elbow pain that she thought was from increased weight lifting.  Progress to right arm tingliness going from the hand to the neck that is now constant.  If she pushes on her lateral epicondyle she does notice right lateral epicondyle irritation. Right elbow pains started began in June, which progressed to numbness in the neck and arm in mid-July..         Type of problem:  Cervical spine.    This is a new condition.  Condition occurred with:  Insidious onset.  Where condition occurred: for unknown reasons.    Pain is described as aching (Dull consant pain) and is constant.  Pain radiates to:  Upper arm right, hand right, elbow right, lower arm right and shoulder right. Pain is worse during the night.  Since onset symptoms are gradually worsening.  Associated symptoms:  Numbness. Symptoms are exacerbated by lying down (Laying in bed on her left side is the worse)  and relieved by NSAID's.  Special tests included:  X-ray (Xray Negative).  There was none improvement following previous treatment.  Restrictions due to condition include:  Working in normal job without restrictions.  Barriers include:  None as reported by patient.                        Objective:  System              Cervical/Thoracic Evaluation    AROM:  AROM Cervical:    Flexion:          Mild  limitation but no change in sxs  Extension:       Full and no change in sxs  Rotation:         Left:     Right:  Side Bend:      Left: Full and no change in sxs     Right:  Mild limitation and right upper trap tightness/pain reported    Strength: 4+/5 in the right shoulder abduction, right shoulder external rotation and right elbow flexion.  All increased right lateral epicondyle pain as well  Headaches: none  Cervical Myotomes:          C5 (Deltoid):  Right: 4+  C6 (Biceps):  Right: 4+  C7 (Triceps):  Right: 5  C8 (Thumb Ext): Right: 5  T1 (Intrinsics): Right: 5    Neural Tension:      Left side:  Radial; Ulnar and Median  negative.  Right side:  Ulnar and Median positive.  Right side:  Radial  negative.  Cervical Dermatomes:  Cervical dermatomes: Pain and numbness trave down C6 dermatome.                    Cervical Palpation:  : Right Lateral Epicondyle.    Tenderness present at Right:    Upper Trap                                                  General     ROS    Assessment/Plan:    Patient is a 37 year old female with cervical and right side elbow complaints.    Patient has the following significant findings with corresponding treatment plan.                Diagnosis 1:  Neck Pain with Right Radicular Symptoms  Pain -  hot/cold therapy, US, manual therapy, self management, education, directional preference exercise and home program  Decreased ROM/flexibility - manual therapy, therapeutic exercise, therapeutic activity and home program  Decreased strength - therapeutic exercise, therapeutic activities and home program    Therapy Evaluation Codes:   1) History comprised of:   Personal factors that impact the plan of care:      None.    Comorbidity factors that impact the plan of care are:      None.     Medications impacting care: None.  2) Examination of Body Systems comprised of:   Body structures and functions that impact the plan of care:      Cervical spine and Elbow.   Activity limitations that impact the  plan of care are:      Bathing, Bending, Cooking, Driving, Reading/Computer work, Sleeping and Laying down.  3) Clinical presentation characteristics are:   Stable/Uncomplicated.  4) Decision-Making    Low complexity using standardized patient assessment instrument and/or measureable assessment of functional outcome.  Cumulative Therapy Evaluation is: Low complexity.    Previous and current functional limitations:  (See Goal Flow Sheet for this information)    Short term and Long term goals: (See Goal Flow Sheet for this information)     Communication ability:  Patient appears to be able to clearly communicate and understand verbal and written communication and follow directions correctly.  Treatment Explanation - The following has been discussed with the patient:   RX ordered/plan of care  Anticipated outcomes  Possible risks and side effects  This patient would benefit from PT intervention to resume normal activities.   Rehab potential is good.    Frequency:  One X week, once daily  Duration:  for 8 weeks  Discharge Plan:  Achieve all LTG.  Independent in home treatment program.  Reach maximal therapeutic benefit.    Please refer to the daily flowsheet for treatment today, total treatment time and time spent performing 1:1 timed codes.

## 2022-08-30 ENCOUNTER — ANCILLARY PROCEDURE (OUTPATIENT)
Dept: MRI IMAGING | Facility: CLINIC | Age: 37
End: 2022-08-30
Attending: FAMILY MEDICINE
Payer: COMMERCIAL

## 2022-08-30 DIAGNOSIS — R20.0 PAIN AND NUMBNESS OF RIGHT UPPER EXTREMITY: ICD-10-CM

## 2022-08-30 DIAGNOSIS — M79.601 PAIN AND NUMBNESS OF RIGHT UPPER EXTREMITY: ICD-10-CM

## 2022-08-30 PROCEDURE — 72141 MRI NECK SPINE W/O DYE: CPT | Mod: TC | Performed by: RADIOLOGY

## 2022-08-30 NOTE — TELEPHONE ENCOUNTER
Left Voicemail (2nd Attempt) for the patient to call back and schedule the following:    Appointment type: return   Provider: dr. Vasquez   Return date: couple days after mri   Specialty phone number: [phone]   Additonal Notes: follow up with Dr. Vasquez can be virtual     [facility] Procedure   Orthopedics, Podiatry, Sports Medicine, ENT/Eye Specialties  [facility]   [phone]

## 2022-09-10 ENCOUNTER — VIRTUAL VISIT (OUTPATIENT)
Dept: ORTHOPEDICS | Facility: CLINIC | Age: 37
End: 2022-09-10
Payer: COMMERCIAL

## 2022-09-10 DIAGNOSIS — M79.601 PAIN AND NUMBNESS OF RIGHT UPPER EXTREMITY: Primary | ICD-10-CM

## 2022-09-10 DIAGNOSIS — R20.0 PAIN AND NUMBNESS OF RIGHT UPPER EXTREMITY: Primary | ICD-10-CM

## 2022-09-10 PROCEDURE — 99441 PR PHYSICIAN TELEPHONE EVALUATION 5-10 MIN: CPT | Mod: TEL | Performed by: FAMILY MEDICINE

## 2022-09-10 NOTE — LETTER
9/10/2022         RE: Judie Guallpa  [address] [facility] [address]        Dear Colleague,    Thank you for referring your patient, Judie Guallpa, to the [facility]. Please see a copy of my visit note below.      [facility] CLINICS AND SURGERY CENTER  SPORTS & ORTHOPEDIC CLINIC VISIT     Sep 10, 2022        ASSESSMENT & PLAN    37-year-old with right upper extremity pain and neck pain with suspected radiculopathy.  Somewhat curious that the abnormalities seen at C5-6 are mostly left-sided on the MRI and her symptoms have been strictly right-sided.  However, I still suspect that this finding is the culprit for her symptoms    Reviewed imaging and assessment with patient in detail  Discussed options for treatment including continued physical therapy, course of oral prednisone, steroid injection    Jorge A Vasquez MD  [facility]    -----  No chief complaint on file.        Judie is a 37 year old who is being evaluated via a billable telephone visit.      What phone number would you like to be contacted at?  [phone]  How would you like to obtain your AVS? [employer]  Phone call duration: 9 minutes         SUBJECTIVE  Judie Guallpa is a/an 37 year old female who is seen for follow up of right upper extremity and neck pain suspected to be due to cervical radiculopathy.  Since last visit symptoms have improved.  She feels that physical therapy has been quite helpful.  Is having less tingling and numbness in the hand.  Still having some lateral elbow pain..  She has used some tizanidine though with very limited relief.            REVIEW OF SYSTEMS:    See HPI     OBJECTIVE:  There were no vitals taken for this visit.     No exam. Telephone visit    RADIOLOGY:    MRI cervical spine dated 8/30/2022.  Per radiology report:      Level by level as follows:      C2-C3: No loss of disc height. No significant disc herniation.  Normal  facets. No spinal canal or neural foraminal narrowing.      C3-C4: No loss of disc height. No significant disc herniation. Normal  facets. No spinal canal or neural foraminal narrowing.      C4-C5: Mild loss of disc height. Shallow circumferential disc bulge.  Normal facets. No spinal canal or neural foraminal narrowing.      C5-C6: Mild loss of disc height. Left subarticular disc extrusion  which extends both above and below the disc level. Normal facets. Mild  spinal canal narrowing particularly on the left. No right neural  foraminal narrowing. Moderate to severe left neural foraminal  narrowing.     C6-C7: No loss of disc height. No significant disc herniation. Normal  facets. No spinal canal or neural foraminal narrowing.      C7-T1: No loss of disc height. No significant disc herniation. Normal  facets. No spinal canal or neural foraminal narrowing.             Again, thank you for allowing me to participate in the care of your patient.        Sincerely,        Jorge A Vasquez MD

## 2022-09-10 NOTE — LETTER
9/10/2022         RE: Judie Guallpa  [address] [facility] [address]        Dear Colleague,    Thank you for referring your patient, Judie Guallpa, to the [facility]. Please see a copy of my visit note below.      [facility] CLINICS AND SURGERY CENTER  SPORTS & ORTHOPEDIC CLINIC VISIT     Sep 10, 2022        ASSESSMENT & PLAN    37-year-old with right upper extremity pain and neck pain with suspected radiculopathy.  Somewhat curious that the abnormalities seen at C5-6 are mostly left-sided on the MRI and her symptoms have been strictly right-sided.  However, I still suspect that this finding is the culprit for her symptoms    Reviewed imaging and assessment with patient in detail  Discussed options for treatment including continued physical therapy, course of oral prednisone, steroid injection    Jorge A Vasquez MD  [facility]    -----  No chief complaint on file.        Judie is a 37 year old who is being evaluated via a billable telephone visit.      What phone number would you like to be contacted at?  [phone]  How would you like to obtain your AVS? [employer]  Phone call duration: 9 minutes         SUBJECTIVE  Judie Guallpa is a/an 37 year old female who is seen for follow up of right upper extremity and neck pain suspected to be due to cervical radiculopathy.  Since last visit symptoms have improved.  She feels that physical therapy has been quite helpful.  Is having less tingling and numbness in the hand.  Still having some lateral elbow pain..  She has used some tizanidine though with very limited relief.            REVIEW OF SYSTEMS:    See HPI     OBJECTIVE:  There were no vitals taken for this visit.     No exam. Telephone visit    RADIOLOGY:    MRI cervical spine dated 8/30/2022.  Per radiology report:      Level by level as follows:      C2-C3: No loss of disc height. No significant disc herniation.  Normal  facets. No spinal canal or neural foraminal narrowing.      C3-C4: No loss of disc height. No significant disc herniation. Normal  facets. No spinal canal or neural foraminal narrowing.      C4-C5: Mild loss of disc height. Shallow circumferential disc bulge.  Normal facets. No spinal canal or neural foraminal narrowing.      C5-C6: Mild loss of disc height. Left subarticular disc extrusion  which extends both above and below the disc level. Normal facets. Mild  spinal canal narrowing particularly on the left. No right neural  foraminal narrowing. Moderate to severe left neural foraminal  narrowing.     C6-C7: No loss of disc height. No significant disc herniation. Normal  facets. No spinal canal or neural foraminal narrowing.      C7-T1: No loss of disc height. No significant disc herniation. Normal  facets. No spinal canal or neural foraminal narrowing.             Again, thank you for allowing me to participate in the care of your patient.        Sincerely,        Jorge A Vasquez MD

## 2022-09-10 NOTE — PROGRESS NOTES
[facility] AND SURGERY CENTER  SPORTS & ORTHOPEDIC CLINIC VISIT     Sep 10, 2022        ASSESSMENT & PLAN    37-year-old with right upper extremity pain and neck pain with suspected radiculopathy.  Somewhat curious that the abnormalities seen at C5-6 are mostly left-sided on the MRI and her symptoms have been strictly right-sided.  However, I still suspect that this finding is the culprit for her symptoms    Reviewed imaging and assessment with patient in detail  Discussed options for treatment including continued physical therapy, course of oral prednisone, steroid injection    Jorge A Vasquez MD  [facility] SPORTS MEDICINE CLINIC [place]    -----  No chief complaint on file.        Judie is a 37 year old who is being evaluated via a billable telephone visit.      What phone number would you like to be contacted at?  [phone]  How would you like to obtain your AVS? [facility]  Phone call duration: 9 minutes         SUBJECTIVE  Judie Guallpa is a/an 37 year old female who is seen for follow up of right upper extremity and neck pain suspected to be due to cervical radiculopathy.  Since last visit symptoms have improved.  She feels that physical therapy has been quite helpful.  Is having less tingling and numbness in the hand.  Still having some lateral elbow pain..  She has used some tizanidine though with very limited relief.            REVIEW OF SYSTEMS:    See HPI     OBJECTIVE:  There were no vitals taken for this visit.     No exam. Telephone visit    RADIOLOGY:    MRI cervical spine dated 8/30/2022.  Per radiology report:      Level by level as follows:      C2-C3: No loss of disc height. No significant disc herniation. Normal  facets. No spinal canal or neural foraminal narrowing.      C3-C4: No loss of disc height. No significant disc herniation. Normal  facets. No spinal canal or neural foraminal narrowing.      C4-C5: Mild loss of disc height. Shallow circumferential disc bulge.  Normal  facets. No spinal canal or neural foraminal narrowing.      C5-C6: Mild loss of disc height. Left subarticular disc extrusion  which extends both above and below the disc level. Normal facets. Mild  spinal canal narrowing particularly on the left. No right neural  foraminal narrowing. Moderate to severe left neural foraminal  narrowing.     C6-C7: No loss of disc height. No significant disc herniation. Normal  facets. No spinal canal or neural foraminal narrowing.      C7-T1: No loss of disc height. No significant disc herniation. Normal  facets. No spinal canal or neural foraminal narrowing.

## 2022-09-12 ENCOUNTER — THERAPY VISIT (OUTPATIENT)
Dept: PHYSICAL THERAPY | Facility: CLINIC | Age: 37
End: 2022-09-12
Payer: COMMERCIAL

## 2022-09-12 DIAGNOSIS — M54.2 CERVICALGIA: ICD-10-CM

## 2022-09-12 PROCEDURE — 97110 THERAPEUTIC EXERCISES: CPT | Mod: GP

## 2022-09-18 ENCOUNTER — HEALTH MAINTENANCE LETTER (OUTPATIENT)
Age: 37
End: 2022-09-18

## 2022-09-19 ENCOUNTER — THERAPY VISIT (OUTPATIENT)
Dept: PHYSICAL THERAPY | Facility: CLINIC | Age: 37
End: 2022-09-19
Payer: COMMERCIAL

## 2022-09-19 DIAGNOSIS — M54.2 CERVICALGIA: Primary | ICD-10-CM

## 2022-09-19 PROCEDURE — 97140 MANUAL THERAPY 1/> REGIONS: CPT | Mod: GP

## 2022-09-19 PROCEDURE — 97110 THERAPEUTIC EXERCISES: CPT | Mod: GP

## 2022-09-19 PROCEDURE — 97035 APP MDLTY 1+ULTRASOUND EA 15: CPT | Mod: GP

## 2022-09-20 ENCOUNTER — MYC REFILL (OUTPATIENT)
Dept: FAMILY MEDICINE | Facility: CLINIC | Age: 37
End: 2022-09-20

## 2022-09-20 DIAGNOSIS — F43.23 ADJUSTMENT DISORDER WITH MIXED ANXIETY AND DEPRESSED MOOD: ICD-10-CM

## 2022-09-21 RX ORDER — VENLAFAXINE HYDROCHLORIDE 75 MG/1
75 CAPSULE, EXTENDED RELEASE ORAL DAILY
Qty: 30 CAPSULE | Refills: 0 | Status: SHIPPED | OUTPATIENT
Start: 2022-09-21 | End: 2022-10-17

## 2022-09-23 ENCOUNTER — IMMUNIZATION (OUTPATIENT)
Dept: NURSING | Facility: CLINIC | Age: 37
End: 2022-09-23
Payer: COMMERCIAL

## 2022-09-23 PROCEDURE — 91313 COVID-19,PF,MODERNA BIVALENT: CPT

## 2022-09-23 PROCEDURE — 0134A COVID-19,PF,MODERNA BIVALENT: CPT

## 2022-10-17 ENCOUNTER — VIRTUAL VISIT (OUTPATIENT)
Dept: FAMILY MEDICINE | Facility: CLINIC | Age: 37
End: 2022-10-17
Payer: COMMERCIAL

## 2022-10-17 DIAGNOSIS — F43.23 ADJUSTMENT DISORDER WITH MIXED ANXIETY AND DEPRESSED MOOD: ICD-10-CM

## 2022-10-17 PROCEDURE — 99213 OFFICE O/P EST LOW 20 MIN: CPT | Mod: 95 | Performed by: NURSE PRACTITIONER

## 2022-10-17 RX ORDER — VENLAFAXINE HYDROCHLORIDE 75 MG/1
75 CAPSULE, EXTENDED RELEASE ORAL DAILY
Qty: 90 CAPSULE | Refills: 1 | Status: SHIPPED | OUTPATIENT
Start: 2022-10-17 | End: 2023-11-02

## 2022-10-17 ASSESSMENT — ANXIETY QUESTIONNAIRES
6. BECOMING EASILY ANNOYED OR IRRITABLE: MORE THAN HALF THE DAYS
3. WORRYING TOO MUCH ABOUT DIFFERENT THINGS: MORE THAN HALF THE DAYS
2. NOT BEING ABLE TO STOP OR CONTROL WORRYING: SEVERAL DAYS
7. FEELING AFRAID AS IF SOMETHING AWFUL MIGHT HAPPEN: NOT AT ALL
7. FEELING AFRAID AS IF SOMETHING AWFUL MIGHT HAPPEN: NOT AT ALL
GAD7 TOTAL SCORE: 8
IF YOU CHECKED OFF ANY PROBLEMS ON THIS QUESTIONNAIRE, HOW DIFFICULT HAVE THESE PROBLEMS MADE IT FOR YOU TO DO YOUR WORK, TAKE CARE OF THINGS AT HOME, OR GET ALONG WITH OTHER PEOPLE: NOT DIFFICULT AT ALL
5. BEING SO RESTLESS THAT IT IS HARD TO SIT STILL: NOT AT ALL
4. TROUBLE RELAXING: MORE THAN HALF THE DAYS
8. IF YOU CHECKED OFF ANY PROBLEMS, HOW DIFFICULT HAVE THESE MADE IT FOR YOU TO DO YOUR WORK, TAKE CARE OF THINGS AT HOME, OR GET ALONG WITH OTHER PEOPLE?: NOT DIFFICULT AT ALL
GAD7 TOTAL SCORE: 8
GAD7 TOTAL SCORE: 8
1. FEELING NERVOUS, ANXIOUS, OR ON EDGE: SEVERAL DAYS

## 2022-10-17 ASSESSMENT — PATIENT HEALTH QUESTIONNAIRE - PHQ9
SUM OF ALL RESPONSES TO PHQ QUESTIONS 1-9: 4
10. IF YOU CHECKED OFF ANY PROBLEMS, HOW DIFFICULT HAVE THESE PROBLEMS MADE IT FOR YOU TO DO YOUR WORK, TAKE CARE OF THINGS AT HOME, OR GET ALONG WITH OTHER PEOPLE: NOT DIFFICULT AT ALL
SUM OF ALL RESPONSES TO PHQ QUESTIONS 1-9: 4

## 2022-10-17 ASSESSMENT — ENCOUNTER SYMPTOMS: NERVOUS/ANXIOUS: 1

## 2022-10-17 NOTE — PROGRESS NOTES
"Judie is a 37 year old who is being evaluated via a billable video visit.      How would you like to obtain your AVS? MyChart  If the video visit is dropped, the invitation should be resent by: Text to cell phone: [phone]  Will anyone else be joining your video visit? No          Assessment & Plan     Adjustment disorder with mixed anxiety and depressed mood  Mood better, has also lost some weight. Continue same dose. Follow up with provider in 3-4 months, sooner if concerns. Virtual visit okay  - venlafaxine (EFFEXOR XR) 75 MG 24 hr capsule; Take 1 capsule (75 mg) by mouth daily    BMI:   Estimated body mass index is 29.57 kg/m  as calculated from the following:    Height as of 1/26/22: 1.689 m (5' 6.5\").    Weight as of 1/26/22: 84.4 kg (186 lb).     Return in about 4 months (around 2/17/2023) for Phone or Video visit prince, Medication check.    GLADYS Limon, NP-C  [facility]    Subjective   Judie is a 37 year old accompanied by her self, presenting for the following health issues:  Depression and Anxiety      Anxiety    History of Present Illness       Mental Health Follow-up:  Patient presents to follow-up on Depression & Anxiety.Patient's depression since last visit has been:  Better  The patient is not having other symptoms associated with depression.  Patient's anxiety since last visit has been:  Better  The patient is not having other symptoms associated with anxiety.  Any significant life events: No  Patient is not feeling anxious or having panic attacks.  Patient has no concerns about alcohol or drug use.    She eats 2-3 servings of fruits and vegetables daily.She consumes 0 sweetened beverage(s) daily.She exercises with enough effort to increase her heart rate 30 to 60 minutes per day.  She exercises with enough effort to increase her heart rate 3 or less days per week.   She is taking medications regularly.    Today's PHQ-9         PHQ-9 Total Score: 4    PHQ-9 Q9 Thoughts " of better off dead/self-harm past 2 weeks :   Not at all    How difficult have these problems made it for you to do your work, take care of things at home, or get along with other people: Not difficult at all  Today's VENKATESH-7 Score: 8           Review of Systems   Psychiatric/Behavioral: The patient is nervous/anxious.       Constitutional, HEENT, cardiovascular, pulmonary, gi and gu systems are negative, except as otherwise noted.      Objective    Vitals - Patient Reported  Pain Score: No Pain (0)        Physical Exam   GENERAL: Healthy, alert and no distress  EYES: Eyes grossly normal to inspection.  No discharge or erythema, or obvious scleral/conjunctival abnormalities.  RESP: No audible wheeze, cough, or visible cyanosis.  No visible retractions or increased work of breathing.    SKIN: Visible skin clear. No significant rash, abnormal pigmentation or lesions.  NEURO: Cranial nerves grossly intact.  Mentation and speech appropriate for age.  PSYCH: Mentation appears normal, affect normal/bright, judgement and insight intact, normal speech and appearance well-groomed.    Did not review labs today            Video-Visit Details    Video Start Time: 12:40 PM    Type of service:  Video Visit    Video End Time:12:43 PM    Originating Location (pt. Location): Home    Distant Location (provider location):  Off-site  Home secure location    Platform used for Video Visit: Yung

## 2022-11-17 NOTE — PROGRESS NOTES
Subjective:  HPI  Physical Exam                    Objective:  System    Physical Exam    General     ROS    Assessment/Plan:    DISCHARGE REPORT    Progress reporting period is from 8/29/2022 to 9/19/2022.       SUBJECTIVE   Subjective: She reports no numbness and no wrist pain in the last week.  Overall everything feels better.  She feels some soreness at the base of her neck after doing the exercises.     Current Pain level: 3/10.      Initial Pain level: 6/10.   Changes in function:  Yes (See Goal flowsheet attached for changes in current functional level)  Adverse reaction to treatment or activity: None    OBJECTIVE  Changes noted in objective findings:  Yes  Objective: TTP to the right lateral epicondyle     ASSESSMENT/PLAN  Updated problem list and treatment plan: Diagnosis 1:  Neck pain with Radicular Pain  Pain -  hot/cold therapy, manual therapy, self management, education, directional preference exercise and home program  Decreased ROM/flexibility - manual therapy, therapeutic exercise, therapeutic activity and home program  Decreased strength - therapeutic exercise, therapeutic activities and home program  Diagnosis 2:  Right Lateral Epicondylitis   Pain -  hot/cold therapy, manual therapy, self management, education, directional preference exercise and home program  Decreased ROM/flexibility - manual therapy, therapeutic exercise, therapeutic activity and home program  Decreased joint mobility - manual therapy, therapeutic exercise, therapeutic activity and home program  STG/LTGs have been met or progress has been made towards goals:  Yes (See Goal flow sheet completed today.)  Assessment of Progress: The patient's condition is improving.  Self Management Plans:  Patient has been instructed in a home treatment program.  Patient is independent in a home treatment program.  Patient  has been instructed in self management of symptoms.  The family/caregiver has been instructed in home continuation of  care.  I have re-evaluated this patient and find that the nature, scope, duration and intensity of the therapy is appropriate for the medical condition of the patient.  Judie continues to require the following intervention to meet STG and LTG's:  PT intervention is no longer required to meet STG/LTG.    Recommendations:  This patient is ready to be discharged from therapy and continue their home treatment program.    Please refer to the daily flowsheet for treatment today, total treatment time and time spent performing 1:1 timed codes.

## 2023-04-24 ENCOUNTER — OFFICE VISIT (OUTPATIENT)
Dept: FAMILY MEDICINE | Facility: CLINIC | Age: 38
End: 2023-04-24
Payer: COMMERCIAL

## 2023-04-24 ENCOUNTER — TELEPHONE (OUTPATIENT)
Dept: FAMILY MEDICINE | Facility: CLINIC | Age: 38
End: 2023-04-24

## 2023-04-24 VITALS
OXYGEN SATURATION: 98 % | WEIGHT: 163 LBS | BODY MASS INDEX: 26.2 KG/M2 | HEART RATE: 66 BPM | SYSTOLIC BLOOD PRESSURE: 114 MMHG | DIASTOLIC BLOOD PRESSURE: 74 MMHG | TEMPERATURE: 97.5 F | RESPIRATION RATE: 14 BRPM | HEIGHT: 66 IN

## 2023-04-24 DIAGNOSIS — F43.23 ADJUSTMENT DISORDER WITH MIXED ANXIETY AND DEPRESSED MOOD: ICD-10-CM

## 2023-04-24 DIAGNOSIS — Z80.3 FAMILY HISTORY OF MALIGNANT NEOPLASM OF BREAST: ICD-10-CM

## 2023-04-24 DIAGNOSIS — Z00.00 ROUTINE GENERAL MEDICAL EXAMINATION AT A HEALTH CARE FACILITY: Primary | ICD-10-CM

## 2023-04-24 DIAGNOSIS — R68.82 DECREASED LIBIDO: ICD-10-CM

## 2023-04-24 PROCEDURE — 99396 PREV VISIT EST AGE 40-64: CPT | Performed by: FAMILY MEDICINE

## 2023-04-24 PROCEDURE — 99213 OFFICE O/P EST LOW 20 MIN: CPT | Mod: 25 | Performed by: FAMILY MEDICINE

## 2023-04-24 ASSESSMENT — ENCOUNTER SYMPTOMS
DIARRHEA: 0
NAUSEA: 0
HEMATOCHEZIA: 0
EYE PAIN: 0
ARTHRALGIAS: 0
ABDOMINAL PAIN: 0
PALPITATIONS: 0
JOINT SWELLING: 0
COUGH: 0
SORE THROAT: 0
HEADACHES: 0
WEAKNESS: 0
HEMATURIA: 0
SHORTNESS OF BREATH: 0
DIZZINESS: 0
FREQUENCY: 0
HEARTBURN: 0
FEVER: 0
DYSURIA: 0
NERVOUS/ANXIOUS: 0
CHILLS: 0
CONSTIPATION: 0
PARESTHESIAS: 0
BREAST MASS: 0
MYALGIAS: 0

## 2023-04-24 ASSESSMENT — PAIN SCALES - GENERAL: PAINLEVEL: NO PAIN (0)

## 2023-04-24 NOTE — TELEPHONE ENCOUNTER
PT has referral from [facility] Emiliana Montano to see Dr. Estrada for low libido. Referring note attached reads: Wants to discuss female libido and Addyi (Flibanserin) medication to help with libido    PT has been informed that [facility] will be in touch in next week or so to begin intake.

## 2023-04-24 NOTE — PROGRESS NOTES
SUBJECTIVE:   CC: Judie is an 38 year old who presents for preventive health visit.       4/24/2023    11:19 AM   Additional Questions   Roomed by Nelly     Patient has been advised of split billing requirements and indicates understanding: Yes  HPI    Today's PHQ-2 Score:       4/24/2023     8:56 AM   PHQ-2 ( 1999 [employer])   Q1: Little interest or pleasure in doing things 1   Q2: Feeling down, depressed or hopeless 1   PHQ-2 Score 2   Q1: Little interest or pleasure in doing things Several days    Several days   Q2: Feeling down, depressed or hopeless Several days    Several days   PHQ-2 Score 2    2           Social History     Tobacco Use     Smoking status: Never     Smokeless tobacco: Never   Vaping Use     Vaping status: Never Used   Substance Use Topics     Alcohol use: Yes     Comment: socially             4/24/2023     8:56 AM   Alcohol Use   Prescreen: >3 drinks/day or >7 drinks/week? Yes   AUDIT SCORE  8         4/24/2023     8:56 AM   AUDIT - Alcohol Use Disorders Identification Test - Reproduced from the World Health Organization Audit 2001 (Second Edition)   1.  How often do you have a drink containing alcohol? 2 to 3 times a week   2.  How many drinks containing alcohol do you have on a typical day when you are drinking? 3 or 4   3.  How often do you have five or more drinks on one occasion? Monthly   4.  How often during the last year have you found that you were not able to stop drinking once you had started? Never   5.  How often during the last year have you failed to do what was normally expected of you because of drinking? Never   6.  How often during the last year have you needed a first drink in the morning to get yourself going after a heavy drinking session? Never   7.  How often during the last year have you had a feeling of guilt or remorse after drinking? Monthly   8.  How often during the last year have you been unable to remember what happened the night before because of your drinking?  Never   9.  Have you or someone else been injured because of your drinking? No   10. Has a relative, friend, doctor or other health care worker been concerned about your drinking or suggested you cut down? No   TOTAL SCORE 8     Reviewed orders with patient.  Reviewed health maintenance and updated orders accordingly - Yes  Labs reviewed in EPIC    Breast Cancer Screening:    Maternal aunt passed away secondary to breast cancer    FHS-7:       1/26/2022     8:17 AM 4/24/2023     9:00 AM   Breast CA Risk Assessment (FHS-7)   Did any of your first-degree relatives have breast or ovarian cancer? No Yes   Did any of your relatives have bilateral breast cancer? Yes No   Did any man in your family have breast cancer? No No   Did any woman in your family have breast and ovarian cancer? Yes No   Did any woman in your family have breast cancer before age 50 y? No No   Do you have 2 or more relatives with breast and/or ovarian cancer? No No   Do you have 2 or more relatives with breast and/or bowel cancer? No No       Mammogram Screening - Offered annual screening and updated Health Maintenance for mutual plan based on risk factor consideration    Pertinent mammograms are reviewed under the imaging tab.    History of abnormal Pap smear: Last 3 Pap and HPV Results:       Latest Ref Rng & Units 1/26/2022     8:34 AM   PAP / HPV   PAP  Negative for Intraepithelial Lesion or Malignancy (NILM)     HPV 16 DNA Negative Negative     HPV 18 DNA Negative Negative     Other HR HPV Negative Negative           Latest Ref Rng & Units 1/26/2022     8:34 AM   PAP / HPV   PAP  Negative for Intraepithelial Lesion or Malignancy (NILM)     HPV 16 DNA Negative Negative     HPV 18 DNA Negative Negative     Other HR HPV Negative Negative       Reviewed and updated as needed this visit by clinical staff    Allergies               Reviewed and updated as needed this visit by Provider                     Review of Systems       OBJECTIVE:   There  were no vitals taken for this visit.  Physical Exam  GENERAL: healthy, alert and no distress  NECK: no adenopathy, no asymmetry, masses, or scars and thyroid normal to palpation  RESP: lungs clear to auscultation - no rales, rhonchi or wheezes  CV: regular rate and rhythm, normal S1 S2, no S3 or S4, no murmur, click or rub, no peripheral edema and peripheral pulses strong  ABDOMEN: soft, nontender, no hepatosplenomegaly, no masses and bowel sounds normal  MS: no gross musculoskeletal defects noted, no edema    Diagnostic Test Results:  Labs reviewed in Epic    ASSESSMENT/PLAN:   (Z00.00) Routine general medical examination at a health care facility  (primary encounter diagnosis)  -Vital stable.  Depression screening abnormal but since changing the medication to venlafaxine, Judie reported that her mood is good.  -Denied thoughts of hurting self  -Very physically active, has intentional 30 pound weight loss in a year due to intermittent fasting.    Plan: CBC with platelets, Comprehensive metabolic         panel (BMP + Alb, Alk Phos, ALT, AST, Total.         Bili, TP), Lipid panel reflex to direct LDL         Fasting      (Z80.3) Family history of malignant neoplasm of breast  -Maternal aunt had history of breast cancer.    Plan: *MA Screening Digital Bilateral         (F43.23) Adjustment disorder with mixed anxiety and depressed mood  -Stable on Effexor XR    (R68.82) Decreased libido  -Ongoing for several years.  -Wants to discuss female libido and Addyi (Flibanserin) medication to help with libido.  Referral sent for sexual health.    Plan: [facility]  Referral      Patient has been advised of split billing requirements and indicates understanding: Yes      COUNSELING:  Reviewed preventive health counseling, as reflected in patient instructions       Regular exercise       Healthy diet/nutrition        She reports that she has never smoked. She has never used smokeless  tobacco.          Stephon Croft MD  [facility]

## 2023-05-03 ENCOUNTER — TELEPHONE (OUTPATIENT)
Dept: FAMILY MEDICINE | Facility: CLINIC | Age: 38
End: 2023-05-03
Payer: COMMERCIAL

## 2023-05-03 NOTE — TELEPHONE ENCOUNTER
Telephone Intake--REST  Date: 5/3/2023  Client Name: Judie   MRN: [id number]  : 1985      Age: 38      Presenting Problem / Reason for Assessment   (Clinical History &Symptoms):     Since having children  Low Libido    Tubal 6 yrs ago      Length of time experiencing symptoms:  11 yrs - slow decline with each child      M.D: Lang Macias MD  --If yes, request records from the provider at the 1st session.    Therapist:  Erica Young  --if yes, request a referral or summary letter from the therapist at the 1st session..    Psychiatrist:  NO  --if yes,, request records from the provider at the 1st session..      Other Medical/Mental Health Diagnoses:  Anxiety        If needed, clarify if patient calling from group home or other supervised living arrangement    Note if patient has no mental health or cognitive diagnosis, but phone behavior suggests impairment      Medications:  Effexor      Primary Care Provider:  Amari Dey MD  --If multiple medical conditions, request recent records from primary doctor at the 1st session..      Referral Source:        Follow Up:        Please Verify Registration    If patient has [address] or Indexing, send to .     Prep Welcome Packet and have patient come half hour beforehand to fill out forms in packet (health history form, transgender history, Safety Screening, PHQ9, and VENKATESH-7.

## 2023-05-19 ENCOUNTER — ANCILLARY PROCEDURE (OUTPATIENT)
Dept: MAMMOGRAPHY | Facility: CLINIC | Age: 38
End: 2023-05-19
Attending: FAMILY MEDICINE
Payer: COMMERCIAL

## 2023-05-19 DIAGNOSIS — Z80.3 FAMILY HISTORY OF MALIGNANT NEOPLASM OF BREAST: ICD-10-CM

## 2023-05-19 PROCEDURE — 77067 SCR MAMMO BI INCL CAD: CPT | Mod: TC | Performed by: RADIOLOGY

## 2023-06-02 ENCOUNTER — ANCILLARY PROCEDURE (OUTPATIENT)
Dept: MAMMOGRAPHY | Facility: CLINIC | Age: 38
End: 2023-06-02
Attending: FAMILY MEDICINE
Payer: COMMERCIAL

## 2023-06-02 ENCOUNTER — ANCILLARY PROCEDURE (OUTPATIENT)
Dept: ULTRASOUND IMAGING | Facility: CLINIC | Age: 38
End: 2023-06-02
Attending: FAMILY MEDICINE
Payer: COMMERCIAL

## 2023-06-02 DIAGNOSIS — R92.8 ABNORMAL MAMMOGRAM: ICD-10-CM

## 2023-06-02 PROCEDURE — 76642 ULTRASOUND BREAST LIMITED: CPT | Mod: RT | Performed by: RADIOLOGY

## 2023-06-02 PROCEDURE — 77065 DX MAMMO INCL CAD UNI: CPT | Mod: RT | Performed by: RADIOLOGY

## 2023-06-02 PROCEDURE — G0279 TOMOSYNTHESIS, MAMMO: HCPCS | Performed by: RADIOLOGY

## 2023-11-02 ENCOUNTER — VIRTUAL VISIT (OUTPATIENT)
Dept: FAMILY MEDICINE | Facility: CLINIC | Age: 38
End: 2023-11-02
Payer: COMMERCIAL

## 2023-11-02 DIAGNOSIS — F43.23 ADJUSTMENT DISORDER WITH MIXED ANXIETY AND DEPRESSED MOOD: ICD-10-CM

## 2023-11-02 PROCEDURE — 99213 OFFICE O/P EST LOW 20 MIN: CPT | Mod: VID | Performed by: PHYSICIAN ASSISTANT

## 2023-11-02 RX ORDER — VENLAFAXINE HYDROCHLORIDE 75 MG/1
75 CAPSULE, EXTENDED RELEASE ORAL DAILY
Qty: 90 CAPSULE | Refills: 1 | Status: SHIPPED | OUTPATIENT
Start: 2023-11-02

## 2023-11-02 ASSESSMENT — ENCOUNTER SYMPTOMS: NERVOUS/ANXIOUS: 1

## 2023-11-02 ASSESSMENT — ANXIETY QUESTIONNAIRES
2. NOT BEING ABLE TO STOP OR CONTROL WORRYING: NEARLY EVERY DAY
GAD7 TOTAL SCORE: 16
3. WORRYING TOO MUCH ABOUT DIFFERENT THINGS: NEARLY EVERY DAY
6. BECOMING EASILY ANNOYED OR IRRITABLE: NEARLY EVERY DAY
7. FEELING AFRAID AS IF SOMETHING AWFUL MIGHT HAPPEN: NOT AT ALL
5. BEING SO RESTLESS THAT IT IS HARD TO SIT STILL: MORE THAN HALF THE DAYS
IF YOU CHECKED OFF ANY PROBLEMS ON THIS QUESTIONNAIRE, HOW DIFFICULT HAVE THESE PROBLEMS MADE IT FOR YOU TO DO YOUR WORK, TAKE CARE OF THINGS AT HOME, OR GET ALONG WITH OTHER PEOPLE: SOMEWHAT DIFFICULT
4. TROUBLE RELAXING: NEARLY EVERY DAY
GAD7 TOTAL SCORE: 16
1. FEELING NERVOUS, ANXIOUS, OR ON EDGE: MORE THAN HALF THE DAYS

## 2023-11-02 ASSESSMENT — PATIENT HEALTH QUESTIONNAIRE - PHQ9: SUM OF ALL RESPONSES TO PHQ QUESTIONS 1-9: 3

## 2023-11-02 NOTE — PROGRESS NOTES
"Judie is a 38 year old who is being evaluated via a billable video visit.        Instructions Relayed to Patient by Virtual Roomer:     Patient is active on [employer]:   Relayed following to patient: \"It looks like you are active on StayTuned[employer], are you able to join the visit this way? If not, do you need us to send you a link now or would you like your provider to send a link via text or email when they are ready to initiate the visit?\"    Reminded patient to ensure they were logged on to virtual visit by arrival time listed. Documented in appointment notes if patient had flexibility to initiate visit sooner than arrival time. If pediatric virtual visit, ensured pediatric patient along with parent/guardian will be present for video visit.     Patient offered the website [email or web address] and/or phone number to [employer] Help line: [phone]   How would you like to obtain your AVS? [place]  If the video visit is dropped, the invitation should be resent by: Text to cell phone: [phone]  Will anyone else be joining your video visit? No          Assessment & Plan   Problem List Items Addressed This Visit          Behavioral    Adjustment disorder with mixed anxiety and depressed mood    Relevant Medications    venlafaxine (EFFEXOR XR) 75 MG 24 hr capsule      Restart Venlafaxine 75 mg daily, patient prefers to start on 75 vs 37.5 mg and taper up, since last time she had no issues with starting at this dose.    Follow up in 6 weeks if neem dose adjustment  Follow up in 6 months if dose appropriate for a routine medication recheck.   15 minutes spent by me on the date of the encounter doing chart review, history and exam, documentation and further activities per the note       BMI:   Estimated body mass index is 26.42 kg/m  as calculated from the following:    Height as of 4/24/23: 1.673 m (5' 5.87\").    Weight as of 4/24/23: 73.9 kg (163 lb).   Weight management plan: Discussed healthy diet and exercise " guidelines        Katty Davies PA-C  [facility] GINNA Dimas is a 38 year old, presenting for the following health issues:  Recheck Medication and Anxiety        11/2/2023     1:48 PM   Additional Questions   Roomed by Michael PATTON       History of Present Illness       Mental Health Follow-up:  Patient presents to follow-up on Anxiety.    Patient's anxiety since last visit has been:  Worse  The patient is having other symptoms associated with anxiety.  Any significant life events: No  Patient is feeling anxious or having panic attacks.  Patient has no concerns about alcohol or drug use.    She eats 2-3 servings of fruits and vegetables daily.She consumes 0 sweetened beverage(s) daily.She exercises with enough effort to increase her heart rate 30 to 60 minutes per day.  She exercises with enough effort to increase her heart rate 4 days per week.   She is taking medications regularly.         Depression and Anxiety Follow-Up  How are you doing with your depression since your last visit? Worsened since pt stopped medication 2 months ago  How are you doing with your anxiety since your last visit?  Worsened   Are you having other symptoms that might be associated with depression or anxiety? No  Have you had a significant life event? No   Do you have any concerns with your use of alcohol or other drugs? No    Social History     Tobacco Use    Smoking status: Never    Smokeless tobacco: Never   Vaping Use    Vaping Use: Never used   Substance Use Topics    Alcohol use: Yes     Comment: socially    Drug use: No         1/26/2022     8:36 AM 10/17/2022    12:32 PM 11/2/2023     4:39 PM   PHQ   PHQ-9 Total Score 3 4 3   Q9: Thoughts of better off dead/self-harm past 2 weeks Not at all Not at all Not at all         8/19/2022     8:33 AM 10/17/2022    12:32 PM 11/2/2023     1:47 PM   VENKATESH-7 SCORE   Total Score 10 (moderate anxiety) 8 (mild anxiety) 16 (severe anxiety)   Total Score 10 8  16         11/2/2023     4:39 PM   Last PHQ-9   1.  Little interest or pleasure in doing things 0   2.  Feeling down, depressed, or hopeless 0   3.  Trouble falling or staying asleep, or sleeping too much 1   4.  Feeling tired or having little energy 1   5.  Poor appetite or overeating 0   6.  Feeling bad about yourself 0   7.  Trouble concentrating 1   8.  Moving slowly or restless 0   Q9: Thoughts of better off dead/self-harm past 2 weeks 0   PHQ-9 Total Score 3   Difficulty at work, home, or with people Not difficult at all         11/2/2023     1:47 PM   VENKATESH-7    1. Feeling nervous, anxious, or on edge 2   2. Not being able to stop or control worrying 3   3. Worrying too much about different things 3   4. Trouble relaxing 3   5. Being so restless that it is hard to sit still 2   6. Becoming easily annoyed or irritable 3   7. Feeling afraid, as if something awful might happen 0   VENKATESH-7 Total Score 16   If you checked any problems, how difficult have they made it for you to do your work, take care of things at home, or get along with other people? Somewhat difficult       Suicide Assessment Five-step Evaluation and Treatment (SAFE-T)        Review of Systems   Psychiatric/Behavioral:  The patient is nervous/anxious.       Constitutional, HEENT, cardiovascular, pulmonary, gi and gu systems are negative, except as otherwise noted.      Objective           Vitals:  No vitals were obtained today due to virtual visit.    Physical Exam   GENERAL: Healthy, alert and no distress  EYES: Eyes grossly normal to inspection.  No discharge or erythema, or obvious scleral/conjunctival abnormalities.  RESP: No audible wheeze, cough, or visible cyanosis.  No visible retractions or increased work of breathing.    SKIN: Visible skin clear. No significant rash, abnormal pigmentation or lesions.  NEURO: Cranial nerves grossly intact.  Mentation and speech appropriate for age.  PSYCH: Mentation appears normal, affect normal/bright,  judgement and insight intact, normal speech and appearance well-groomed.                Video-Visit Details    Type of service:  Video Visit     Originating Location (pt. Location): Home    Distant Location (provider location):  On-site  Platform used for Video Visit: Yung

## 2024-03-25 ENCOUNTER — PATIENT OUTREACH (OUTPATIENT)
Dept: CARE COORDINATION | Facility: CLINIC | Age: 39
End: 2024-03-25
Payer: COMMERCIAL

## 2024-04-08 ENCOUNTER — PATIENT OUTREACH (OUTPATIENT)
Dept: CARE COORDINATION | Facility: CLINIC | Age: 39
End: 2024-04-08
Payer: COMMERCIAL

## 2024-07-14 ENCOUNTER — HEALTH MAINTENANCE LETTER (OUTPATIENT)
Age: 39
End: 2024-07-14

## 2024-08-09 ENCOUNTER — VIRTUAL VISIT (OUTPATIENT)
Dept: FAMILY MEDICINE | Facility: CLINIC | Age: 39
End: 2024-08-09
Payer: COMMERCIAL

## 2024-08-09 DIAGNOSIS — R05.3 PERSISTENT COUGH: Primary | ICD-10-CM

## 2024-08-09 PROCEDURE — 99213 OFFICE O/P EST LOW 20 MIN: CPT | Mod: 95 | Performed by: PHYSICIAN ASSISTANT

## 2024-08-09 NOTE — PATIENT INSTRUCTIONS
For further evaluation of your ongoing cough I have placed an order for a chest x-ray.  Please call your home clinic or use [facility] to schedule your x-ray.  We will base next steps on x-ray results.  Please reach out with any questions or concerns and let me know of any new or worsening symptoms.

## 2024-08-09 NOTE — PROGRESS NOTES
Judie is a 39 year old who is being evaluated via a billable video visit.    How would you like to obtain your AVS? MyChart  If the video visit is dropped, the invitation should be resent by: Text to cell phone: [phone]  Will anyone else be joining your video visit? No      Assessment & Plan     Persistent cough  Patient is a 39-year-old female who presents to virtual visit due to 1 month of persistent dry cough.  At the start of symptoms patient did have URI with fevers.  All other URI symptoms have resolved.  COVID-19 testing was negative x 2.  Vital signs normal.  Patient does have intermittent dry cough during evaluation.  She has tried nebulizer as well as 2 courses of prednisone without improvement in symptoms.  No history of asthma, allergic rhinitis, or GERD.  Lower suspicion for pneumonia as patient denies fevers, chest pain.  Low suspicion for PE as no dyspnea or chest pain.  Will complete chest x-ray for further evaluation.  Next steps will be based on x-ray results.  Follow-up precautions provided.  - XR Chest 2 Views; Future    See Patient Instructions    Subjective   Judie is a 39 year old, presenting for the following health issues:  Illness      8/9/2024    11:05 AM   Additional Questions   Roomed by MP         8/9/2024    11:05 AM   Patient Reported Additional Medications   Patient reports taking the following new medications None per patient     HPI     Duration:  1 month   Sympom severity:  Moderate to severe   Treatments tried:  neb              Symptoms: Present Comment   Fever/Chills x First week-not since   Fatigue/Fussy x    Muscle Aches     Eye Issue     Sneezing     Nasal Alfred/Drg     Sinus Pressure/Pain     Dental pain     Sore Throat     Swollen Glands     Ear Pain/Fullness     Cough x Feels rattling but dry cough   Shortness of breath x    Rash     Headache     Stomach/GI issues x Vomiting due to cough   Other        Patient developed symptoms of fever, cough, chills one month ago.  Triage note reviewed.  Has had on and off HA since Monday. Sore throat came and went, muscle pain resolved. Was seen yesterday here. COVID negative.  Right sided ear pain/neck/right sided right pressure under eye. Denies sinus congestion. Concerned about an ear infection. No hx of ear issues. Pt jumped from high platform a week ago and into the sea. Ear issues since.  Tried drops for water in ears.   Negative COVID19 testing. Cough is lingering and can become severe. Patient will start coughing and cannot stop at times. Patient tried nebulizer and Prednisone 40mg X 5 days 2 times without improvement with either treatment. No dyspnea, chest pain. Cough is dry.         Objective           Vitals:  No vitals were obtained today due to virtual visit.    Physical Exam   GENERAL: alert and no distress  EYES: Eyes grossly normal to inspection.  No discharge or erythema, or obvious scleral/conjunctival abnormalities.  RESP: Intermittent dry cough.  No audible wheeze or visible cyanosis.    SKIN: Visible skin clear. No significant rash, abnormal pigmentation or lesions.  NEURO: Cranial nerves grossly intact.  Mentation and speech appropriate for age.  PSYCH: Appropriate affect, tone, and pace of words        Video-Visit Details    Type of service:  Video Visit   Originating Location (pt. Location): Other car    Distant Location (provider location):  On-site  Platform used for Video Visit: Yung  Signed Electronically by: Carolina Omer PA-C

## 2024-08-10 ENCOUNTER — ANCILLARY PROCEDURE (OUTPATIENT)
Dept: GENERAL RADIOLOGY | Facility: CLINIC | Age: 39
End: 2024-08-10
Attending: PHYSICIAN ASSISTANT
Payer: COMMERCIAL

## 2024-08-10 DIAGNOSIS — R05.3 PERSISTENT COUGH: ICD-10-CM

## 2024-08-10 PROCEDURE — 71046 X-RAY EXAM CHEST 2 VIEWS: CPT | Mod: TC | Performed by: RADIOLOGY

## 2024-10-21 ENCOUNTER — PATIENT OUTREACH (OUTPATIENT)
Dept: CARE COORDINATION | Facility: CLINIC | Age: 39
End: 2024-10-21
Payer: COMMERCIAL

## 2025-02-25 ASSESSMENT — ANXIETY QUESTIONNAIRES
7. FEELING AFRAID AS IF SOMETHING AWFUL MIGHT HAPPEN: NOT AT ALL
8. IF YOU CHECKED OFF ANY PROBLEMS, HOW DIFFICULT HAVE THESE MADE IT FOR YOU TO DO YOUR WORK, TAKE CARE OF THINGS AT HOME, OR GET ALONG WITH OTHER PEOPLE?: VERY DIFFICULT
IF YOU CHECKED OFF ANY PROBLEMS ON THIS QUESTIONNAIRE, HOW DIFFICULT HAVE THESE PROBLEMS MADE IT FOR YOU TO DO YOUR WORK, TAKE CARE OF THINGS AT HOME, OR GET ALONG WITH OTHER PEOPLE: VERY DIFFICULT
2. NOT BEING ABLE TO STOP OR CONTROL WORRYING: MORE THAN HALF THE DAYS
1. FEELING NERVOUS, ANXIOUS, OR ON EDGE: NEARLY EVERY DAY
GAD7 TOTAL SCORE: 13
3. WORRYING TOO MUCH ABOUT DIFFERENT THINGS: NEARLY EVERY DAY
GAD7 TOTAL SCORE: 13
6. BECOMING EASILY ANNOYED OR IRRITABLE: NEARLY EVERY DAY
7. FEELING AFRAID AS IF SOMETHING AWFUL MIGHT HAPPEN: NOT AT ALL
4. TROUBLE RELAXING: MORE THAN HALF THE DAYS
5. BEING SO RESTLESS THAT IT IS HARD TO SIT STILL: NOT AT ALL
GAD7 TOTAL SCORE: 13

## 2025-02-27 ENCOUNTER — VIRTUAL VISIT (OUTPATIENT)
Dept: FAMILY MEDICINE | Facility: CLINIC | Age: 40
End: 2025-02-27
Payer: COMMERCIAL

## 2025-02-27 DIAGNOSIS — F32.A ANXIETY AND DEPRESSION: Primary | ICD-10-CM

## 2025-02-27 DIAGNOSIS — F41.9 ANXIETY AND DEPRESSION: Primary | ICD-10-CM

## 2025-02-27 RX ORDER — ESCITALOPRAM OXALATE 10 MG/1
10 TABLET ORAL DAILY
Qty: 90 TABLET | Refills: 0 | Status: SHIPPED | OUTPATIENT
Start: 2025-02-27

## 2025-02-27 ASSESSMENT — PATIENT HEALTH QUESTIONNAIRE - PHQ9: SUM OF ALL RESPONSES TO PHQ QUESTIONS 1-9: 17

## 2025-02-27 ASSESSMENT — ENCOUNTER SYMPTOMS
DECREASED CONCENTRATION: 1
NERVOUS/ANXIOUS: 1
SLEEP DISTURBANCE: 1

## 2025-02-27 NOTE — PROGRESS NOTES
Judie is a 40 year old who is being evaluated via a billable video visit.    How would you like to obtain your AVS? [employer]  If the video visit is dropped, the invitation should be resent by: Text to cell phone: [phone]  Will anyone else be joining your video visit? No      Assessment & Plan     Anxiety and depression  Patient is a 40-year-old female who presents to virtual visit due to worsening anxiety/depression over the last few weeks.  Patient notes history of anxiety/depression which was treated with Zoloft and Effexor, but patient stopped therapy early.  Patient admits to thoughts of self-harm, but denies plan.  Discussed treatment options.  Will start daily escitalopram.  Priority referral placed to counseling.  Crisis information provided.  Recommended PCP follow-up in 1 month.  Sooner follow-up for any new or worsening symptoms.  - escitalopram (LEXAPRO) 10 MG tablet; Take 1 tablet (10 mg) by mouth daily.  - Adult Mental Health  Referral; Future        See Patient Instructions    Subjective   Judie is a 40 year old, presenting for the following health issues:  Anxiety and Depression      2/27/2025     9:32 AM   Additional Questions   Roomed by Patient completed questions via Vessel     History of Present Illness       Mental Health Follow-up:  Patient presents to follow-up on Depression & Anxiety.Patient's depression since last visit has been:  Worse  The patient is having other symptoms associated with depression.  Patient's anxiety since last visit has been:  Worse  The patient is having other symptoms associated with anxiety.  Any significant life events: relationship concerns  Patient is feeling anxious or having panic attacks.  Patient has no concerns about alcohol or drug use.    She eats 0-1 servings of fruits and vegetables daily.She consumes 0 sweetened beverage(s) daily.She exercises with enough effort to increase her heart rate 60 or more minutes per day.  She exercises with enough  effort to increase her heart rate 4 days per week. She is missing 3 dose(s) of medications per week.  She is not taking prescribed medications regularly due to remembering to take.     Patient notes that she met with a few people in the past for depression and tried Zoloft and Effexor. She never remembered to take medications regularly. In the past few months depression has worsened and patient feels like she could use help with this. Patient and  did marriage counseling in the past. Patient is not getting along with  or friends. She is skipping kids practices because she does not want to talk to others. Patient wakes most nights early.     Brother was diagnosed with bi-polar type 2.         10/17/2022    12:32 PM 11/2/2023     1:47 PM 2/25/2025     1:35 PM   VENKATESH-7 SCORE   Total Score 8 (mild anxiety) 16 (severe anxiety) 13 (moderate anxiety)   Total Score 8 16 13        Patient-reported     PATIENT HEALTH QUESTIONNAIRE-9 (PHQ - 9)    Over the last 2 weeks, how often have you been bothered by any of the following problems?    1. Little interest or pleasure in doing things -  More than half the days   2. Feeling down, depressed, or hopeless -  More than half the days   3. Trouble falling or staying asleep, or sleeping too much - Nearly every day   4. Feeling tired or having little energy -  Nearly every day   5. Poor appetite or overeating -  Not at all   6. Feeling bad about yourself - or that you are a failure or have let yourself or your family down -  Nearly every day   7. Trouble concentrating on things, such as reading the newspaper or watching television - Nearly every day   8. Moving or speaking so slowly that other people could have noticed? Or the opposite - being so fidgety or restless that you have been moving around a lot more than usual Not at all   9. Thoughts that you would be better off dead or of hurting  yourself in some way Several days   Total Score: 17     If you checked off any  problems, how difficult have these problems made it for you to do your work, take care of things at home, or get along with other people? Very difficult    Developed by Kamilla Ramirez, Jenn Simon, David Aguilar and colleagues, with an educational shady from [employer] No permission required to reproduce, translate, display or distribute. permission required to reproduce, translate, display or distribute.        Review of Systems   Psychiatric/Behavioral:  Positive for decreased concentration and sleep disturbance. Negative for self-injury and suicidal ideas. The patient is nervous/anxious.            Objective           Vitals:  No vitals were obtained today due to virtual visit.    Physical Exam   GENERAL: alert and no distress  EYES: Eyes grossly normal to inspection.  No discharge or erythema, or obvious scleral/conjunctival abnormalities.  RESP: No audible wheeze, cough, or visible cyanosis.    SKIN: Visible skin clear. No significant rash, abnormal pigmentation or lesions.  NEURO: Cranial nerves grossly intact.  Mentation and speech appropriate for age.  PSYCH: Appropriate affect, tone, and pace of words          Video-Visit Details    Type of service:  Video Visit   Originating Location (pt. Location): Home    Distant Location (provider location):  On-site  Platform used for Video Visit: Yung  Signed Electronically by: Carolina Omer PA-C

## 2025-02-27 NOTE — PATIENT INSTRUCTIONS
To help with depression and anxiety you have been prescribed Lexapro/escitalopram.  Keep in mind that it can take this medication up to 8 weeks to have full effect.  I also placed a referral to counseling and scheduling will call you to set this up.  Please see the attached handouts for additional information.  I have also added crisis information below in case you are ever in need of talking to someone urgently/emergently.    As discussed, please schedule a follow-up visit with one of our primary care providers in 1 month for a recheck.  If primary care is not available, I would be happy to follow-up with you.  Please reach out with questions or concerns and follow-up sooner for new or worsening symptoms.    Take Care,  Carolina Omer PA-C      In an emergency--call 911  Don't leave the person alone. Anyone who is at imminent risk of suicide needs psychiatric services right away. The person must be constantly watched, and never left out of sight. Call 911 or a 24-hour suicide crisis hotline. You can search for this online. You can also take the person to the nearest hospital emergency room.     Don't keep it a secret and don't wait  Call a mental health clinic or a licensed mental health professional in your area right away. This may be a psychiatrist, clinical psychologist, psychiatric or licensed clinical , marriage and family counselor, or clergy. If you don't know how to contact such professionals and there is an immediate risk, call 911. Tell them you need help for a person who is thinking about suicide.     Resources  National Suicide Prevention Dqksjows458-253-4293 (617-889-YAHS)www.suicidepreventionlifeline.org  National Suicide Rrjppcq480-032-1448 (800-SUICIDE)  National [place] of Mental Qvdsyu226-824-0635bwf.nimh.nih.gov  National [place] on Mental Gcbkjtj027-926-3295dzj.[place].org  Mental Health Rxktkzf448-796-8330dmn.[facility].org

## 2025-07-19 ENCOUNTER — HEALTH MAINTENANCE LETTER (OUTPATIENT)
Age: 40
End: 2025-07-19

## 2025-08-09 ENCOUNTER — HEALTH MAINTENANCE LETTER (OUTPATIENT)
Age: 40
End: 2025-08-09